# Patient Record
Sex: FEMALE | Race: WHITE | Employment: FULL TIME
[De-identification: names, ages, dates, MRNs, and addresses within clinical notes are randomized per-mention and may not be internally consistent; named-entity substitution may affect disease eponyms.]

---

## 2017-08-05 ENCOUNTER — HEALTH MAINTENANCE LETTER (OUTPATIENT)
Age: 42
End: 2017-08-05

## 2020-04-23 ENCOUNTER — TELEPHONE (OUTPATIENT)
Dept: PEDIATRICS | Facility: CLINIC | Age: 45
End: 2020-04-23

## 2020-04-23 ENCOUNTER — VIRTUAL VISIT (OUTPATIENT)
Dept: PEDIATRICS | Facility: CLINIC | Age: 45
End: 2020-04-23
Payer: COMMERCIAL

## 2020-04-23 DIAGNOSIS — F43.9 SITUATIONAL STRESS: ICD-10-CM

## 2020-04-23 DIAGNOSIS — F41.1 GAD (GENERALIZED ANXIETY DISORDER): Primary | ICD-10-CM

## 2020-04-23 PROBLEM — N92.0 MENORRHAGIA: Status: ACTIVE | Noted: 2020-04-23

## 2020-04-23 PROCEDURE — 99203 OFFICE O/P NEW LOW 30 MIN: CPT | Mod: 95 | Performed by: NURSE PRACTITIONER

## 2020-04-23 RX ORDER — LEVONORGESTREL/ETHIN.ESTRADIOL 0.1-0.02MG
TABLET ORAL
COMMUNITY
Start: 2019-05-14 | End: 2022-02-11

## 2020-04-23 RX ORDER — SERTRALINE HYDROCHLORIDE 25 MG/1
TABLET, FILM COATED ORAL
Qty: 67 TABLET | Refills: 0 | Status: SHIPPED | OUTPATIENT
Start: 2020-04-23 | End: 2020-05-22

## 2020-04-23 ASSESSMENT — ANXIETY QUESTIONNAIRES
3. WORRYING TOO MUCH ABOUT DIFFERENT THINGS: NEARLY EVERY DAY
7. FEELING AFRAID AS IF SOMETHING AWFUL MIGHT HAPPEN: NEARLY EVERY DAY
GAD7 TOTAL SCORE: 16
2. NOT BEING ABLE TO STOP OR CONTROL WORRYING: NEARLY EVERY DAY
IF YOU CHECKED OFF ANY PROBLEMS ON THIS QUESTIONNAIRE, HOW DIFFICULT HAVE THESE PROBLEMS MADE IT FOR YOU TO DO YOUR WORK, TAKE CARE OF THINGS AT HOME, OR GET ALONG WITH OTHER PEOPLE: EXTREMELY DIFFICULT
6. BECOMING EASILY ANNOYED OR IRRITABLE: SEVERAL DAYS
1. FEELING NERVOUS, ANXIOUS, OR ON EDGE: NEARLY EVERY DAY
5. BEING SO RESTLESS THAT IT IS HARD TO SIT STILL: SEVERAL DAYS

## 2020-04-23 ASSESSMENT — PATIENT HEALTH QUESTIONNAIRE - PHQ9
SUM OF ALL RESPONSES TO PHQ QUESTIONS 1-9: 5
5. POOR APPETITE OR OVEREATING: MORE THAN HALF THE DAYS

## 2020-04-23 NOTE — TELEPHONE ENCOUNTER
Please call pt and help her schedule phone visit for anxiety follow-up/med check in 4 wks with Blanca Castro or Krystin Menchaca  Thanks  Olya Whyte, APRN, CNP

## 2020-04-23 NOTE — TELEPHONE ENCOUNTER
The pt is aware and scheduled for her upcoming appointment.   Felicia Worthington on 4/23/2020 at 11:12 AM

## 2020-04-23 NOTE — PROGRESS NOTES
"Melina Terrell is a 45 year old female who is being evaluated via a billable telephone visit.      The patient has been notified of following:     \"This telephone visit will be conducted via a call between you and your physician/provider. We have found that certain health care needs can be provided without the need for a physical exam.  This service lets us provide the care you need with a short phone conversation.  If a prescription is necessary we can send it directly to your pharmacy.  If lab work is needed we can place an order for that and you can then stop by our lab to have the test done at a later time.    Telephone visits are billed at different rates depending on your insurance coverage. During this emergency period, for some insurers they may be billed the same as an in-person visit.  Please reach out to your insurance provider with any questions.    If during the course of the call the physician/provider feels a telephone visit is not appropriate, you will not be charged for this service.\"    Patient has given verbal consent for Telephone visit?  Yes    How would you like to obtain your AVS? Mail a copy    Subjective   HPI  Abnormal Mood Symptoms      Duration: 1 month    Description:  Depression: YES  Anxiety: YES  Panic attacks: YES     Accompanying signs and symptoms: see PHQ-9 and JOCELYNE scores    History (similar episodes/previous evaluation): None    Precipitating or alleviating factors: deep breaths    Therapies tried and outcome: none    Pt new to the clinic. In otherwise good health. Hx of social anxiety for \"years\" but has always been able to cope with this.    Notes over the past 1 month or so she has felt increased anxiety. 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 She worries about getting sick and/or getting her family sick.   Notes she also has hx of IBS exacerbated by stress/anxiety and notes increased diarrhea and panic symptoms. She is able to fall asleep and " sleep through the night but has used OTC sleep aids on occasion. The patient denies any thought of suicide, self harm, or harming others. She has been going on walks daily and watching TV to relax. No previous meds for anxiety or depression. Admits anxiety>depression. She feels she doesn't want to go to work and missed a day of work due to anxiety, which is very unlike her.       Reviewed and updated as needed this visit by Provider  Meds  Problems         Review of Systems   Otherwise ROS is negative except as stated above.       Objective   Reported vitals:  There were no vitals taken for this visit.   PSYCH: Alert and oriented times 3; coherent speech, normal   rate and volume, able to articulate logical thoughts, able   to abstract reason, no tangential thoughts, no hallucinations   or delusions  Her affect is normal and pleasant  RESP: No cough, no audible wheezing, able to talk in full sentences  Remainder of exam unable to be completed due to telephone visits    Diagnostic Test Results:  none       Assessment/Plan:  1. JOCELYNE (generalized anxiety disorder)  2. Situational stress  Discussed proper use of medication and possible s/e. Also encouraged she continue with self care. She declines therapist but would consider in the future if anxiety worsens. Follow-up in 4 weeks for med check, to let me know sooner if any problems with the medication.  - sertraline (ZOLOFT) 25 MG tablet; Take 1 tablet (25 mg) by mouth daily for 7 days, THEN 2 tablets (50 mg) daily.  Dispense: 67 tablet; Refill: 0    Return in about 4 weeks (around 5/21/2020) for Routine Visit.    Phone call duration:  26  Minutes (review of PMH, counseling on anxiety, and medications)    SALIMA Cruz, CNP

## 2020-04-24 ASSESSMENT — ANXIETY QUESTIONNAIRES: GAD7 TOTAL SCORE: 16

## 2020-05-21 NOTE — PROGRESS NOTES
"Melina Terrell is a 45 year old female who is being evaluated via a billable video visit.      The patient has been notified of following:     \"This video visit will be conducted via a call between you and your physician/provider. We have found that certain health care needs can be provided without the need for an in-person physical exam.  This service lets us provide the care you need with a video conversation.  If a prescription is necessary we can send it directly to your pharmacy.  If lab work is needed we can place an order for that and you can then stop by our lab to have the test done at a later time.    Video visits are billed at different rates depending on your insurance coverage.  Please reach out to your insurance provider with any questions.    If during the course of the call the physician/provider feels a video visit is not appropriate, you will not be charged for this service.\"    Patient has given verbal consent for Video visit? {YES-NO  Default Yes:4444::\"Yes\"}    How would you like to obtain your AVS? {AVS Preference:106077}    Patient would like the video invitation sent by: {video visit invitation:947710}    Will anyone else be joining your video visit? {:378085}  {If patient encounters technical issues they should call [phone] :799711}    Subjective     Melina Terrell is a 45 year old female who presents today via video visit for the following health issues:    HPI  {SUPERLIST (Optional):415203}  {PEDS Chronic and Acute Problems (Optional):472457}     Video Start Time: {video visit start/end time for provider to select:786386}    {additonal problems for provider to add (Optional):006622}    {HIST REVIEW/ LINKS 2 (Optional):855173}    Reviewed and updated as needed this visit by Provider         Review of Systems   {ROS COMP (Optional):035425}      Objective    There were no vitals taken for this visit.  Estimated body mass index is 26.85 kg/m  as calculated from the " "following:    Height as of 12/20/13: 1.613 m (5' 3.5\").    Weight as of 12/20/13: 69.9 kg (154 lb).  Physical Exam     {video visit exam brief selected:051346::\"GENERAL: Healthy, alert and no distress\",\"EYES: Eyes grossly normal to inspection.  No discharge or erythema, or obvious scleral/conjunctival abnormalities.\",\"RESP: No audible wheeze, cough, or visible cyanosis.  No visible retractions or increased work of breathing.  \",\"SKIN: Visible skin clear. No significant rash, abnormal pigmentation or lesions.\",\"NEURO: Cranial nerves grossly intact.  Mentation and speech appropriate for age.\",\"PSYCH: Mentation appears normal, affect normal/bright, judgement and insight intact, normal speech and appearance well-groomed.\"}      {Diagnostic Test Results (Optional):293269::\"Diagnostic Test Results:\",\"Labs reviewed in Epic\"}        {PROVIDER CHARTING PREFERENCE:547446}      Video-Visit Details    Type of service:  Video Visit    Video End Time:{video visit start/end time for provider to select:431470}    Originating Location (pt. Location): {video visit patient location:364471::\"Home\"}    Distant Location (provider location):  [facility]     Platform used for Video Visit: {Virtual Visit Platforms:511343::\"[employer]\"}    No follow-ups on file.       {signature options:439494}      "

## 2020-05-22 ENCOUNTER — VIRTUAL VISIT (OUTPATIENT)
Dept: PEDIATRICS | Facility: CLINIC | Age: 45
End: 2020-05-22
Payer: COMMERCIAL

## 2020-05-22 DIAGNOSIS — F41.9 ANXIETY: Primary | ICD-10-CM

## 2020-05-22 DIAGNOSIS — K58.0 IRRITABLE BOWEL SYNDROME WITH DIARRHEA: ICD-10-CM

## 2020-05-22 PROCEDURE — 99213 OFFICE O/P EST LOW 20 MIN: CPT | Mod: 95 | Performed by: NURSE PRACTITIONER

## 2020-05-22 ASSESSMENT — ANXIETY QUESTIONNAIRES
7. FEELING AFRAID AS IF SOMETHING AWFUL MIGHT HAPPEN: SEVERAL DAYS
1. FEELING NERVOUS, ANXIOUS, OR ON EDGE: SEVERAL DAYS
3. WORRYING TOO MUCH ABOUT DIFFERENT THINGS: NOT AT ALL
GAD7 TOTAL SCORE: 4
2. NOT BEING ABLE TO STOP OR CONTROL WORRYING: SEVERAL DAYS
5. BEING SO RESTLESS THAT IT IS HARD TO SIT STILL: NOT AT ALL
6. BECOMING EASILY ANNOYED OR IRRITABLE: NOT AT ALL
IF YOU CHECKED OFF ANY PROBLEMS ON THIS QUESTIONNAIRE, HOW DIFFICULT HAVE THESE PROBLEMS MADE IT FOR YOU TO DO YOUR WORK, TAKE CARE OF THINGS AT HOME, OR GET ALONG WITH OTHER PEOPLE: NOT DIFFICULT AT ALL

## 2020-05-22 ASSESSMENT — PATIENT HEALTH QUESTIONNAIRE - PHQ9
5. POOR APPETITE OR OVEREATING: SEVERAL DAYS
SUM OF ALL RESPONSES TO PHQ QUESTIONS 1-9: 3

## 2020-05-22 NOTE — PROGRESS NOTES
"Melina Terrell is a 45 year old female who is being evaluated via a billable telephone visit.      The patient has been notified of following:     \"This telephone visit will be conducted via a call between you and your physician/provider. We have found that certain health care needs can be provided without the need for a physical exam.  This service lets us provide the care you need with a short phone conversation.  If a prescription is necessary we can send it directly to your pharmacy.  If lab work is needed we can place an order for that and you can then stop by our lab to have the test done at a later time.    Telephone visits are billed at different rates depending on your insurance coverage. During this emergency period, for some insurers they may be billed the same as an in-person visit.  Please reach out to your insurance provider with any questions.    If during the course of the call the physician/provider feels a telephone visit is not appropriate, you will not be charged for this service.\"    Patient has given verbal consent for Telephone visit?  Yes    What phone number would you like to be contacted at? [phone]    How would you like to obtain your AVS? Mail a copy    Subjective     Melina Terrell is a 45 year old female who presents via phone visit today for the following health issues:    HPI  Medication Followup of  zoloft     Taking Medication as prescribed: yes    Side Effects:  Dry mouth    Medication Helping Symptoms:  Yes - this week has been rough      She was started on sertraline 4/23/20  History of anxiety but had not been treated with a medication prior to this  With starting of medication, felt calmer  Could feel anxiety coming on but wouldn't be as bad  Has dry mouth from it  Not too bothersome  Also history of IBS-D   Worsens with anxiety  Not sure if it worsened since medication  This has been a stressful week in general  She works  at [employer] " office  Only one doing   Busy at work      Anxiety Follow-Up    How are you doing with your anxiety since your last visit? Improved     Are you having other symptoms that might be associated with anxiety? No    Have you had a significant life event? OTHER: grandfather     Are you feeling depressed? No    Do you have any concerns with your use of alcohol or other drugs? No    Social History     Tobacco Use     Smoking status: Never Smoker     Smokeless tobacco: Never Used   Substance Use Topics     Alcohol use: Yes     Drug use: Never     JOCELYNE-7 SCORE 4/23/2020   Total Score 16     PHQ 4/23/2020   PHQ-9 Total Score 5   Q9: Thoughts of better off dead/self-harm past 2 weeks Not at all       Patient Active Problem List   Diagnosis     Menorrhagia     Irritable bowel syndrome with diarrhea     History reviewed. No pertinent surgical history.    Social History     Tobacco Use     Smoking status: Never Smoker     Smokeless tobacco: Never Used   Substance Use Topics     Alcohol use: Yes     Family History   Problem Relation Age of Onset     Diabetes Other         grandparent     Hearing Loss Other         grandparent     Hypertension Other         grandparent     Cerebrovascular Disease Other         grandpa     Depression Other         daughter, mom, son           Reviewed and updated as needed this visit by Provider         Review of Systems   Constitutional, HEENT, cardiovascular, pulmonary, gi and gu systems are negative, except as otherwise noted.       Objective   Reported vitals:  There were no vitals taken for this visit.   healthy, alert and no distress  PSYCH: Alert and oriented times 3; coherent speech, normal   rate and volume, able to articulate logical thoughts, able   to abstract reason, no tangential thoughts, no hallucinations   or delusions  Her affect is normal and pleasant  RESP: No cough, no audible wheezing, able to talk in full sentences  Remainder of exam unable to be completed due to  telephone visits    Diagnostic Test Results:  Labs reviewed in Epic        Assessment/Plan:  1. Anxiety  - Dry mouth common side effect of serotonin specific reuptake inhibitor. Reviewed tricks and strategies to treat. Ultimately, if bothersome, can try switching to different medication. Increase dose of sertraline to therapeutic level. 75 mg for 2 weeks then up to 100 mg. Monitor for worsening side effects. Then consider switching medication. F/U in 6 weeks    2. Irritable bowel syndrome with diarrhea  - Currently menstruating. Usually worse around her period      Return in about 6 weeks (around 7/3/2020) for Follow up.      Phone call duration:  14 minutes    SALIMA Johnson CNP

## 2020-05-23 ASSESSMENT — ANXIETY QUESTIONNAIRES: GAD7 TOTAL SCORE: 4

## 2020-11-22 ENCOUNTER — HEALTH MAINTENANCE LETTER (OUTPATIENT)
Age: 45
End: 2020-11-22

## 2021-01-28 ENCOUNTER — IMMUNIZATION (OUTPATIENT)
Dept: NURSING | Facility: CLINIC | Age: 46
End: 2021-01-28
Payer: COMMERCIAL

## 2021-01-28 PROCEDURE — 91300 PR COVID VAC PFIZER DIL RECON 30 MCG/0.3 ML IM: CPT

## 2021-01-28 PROCEDURE — 0001A PR COVID VAC PFIZER DIL RECON 30 MCG/0.3 ML IM: CPT

## 2021-02-13 ENCOUNTER — HEALTH MAINTENANCE LETTER (OUTPATIENT)
Age: 46
End: 2021-02-13

## 2021-02-18 ENCOUNTER — IMMUNIZATION (OUTPATIENT)
Dept: NURSING | Facility: CLINIC | Age: 46
End: 2021-02-18
Attending: INTERNAL MEDICINE
Payer: COMMERCIAL

## 2021-02-18 PROCEDURE — 0002A PR COVID VAC PFIZER DIL RECON 30 MCG/0.3 ML IM: CPT

## 2021-02-18 PROCEDURE — 91300 PR COVID VAC PFIZER DIL RECON 30 MCG/0.3 ML IM: CPT

## 2021-02-26 DIAGNOSIS — Z12.31 VISIT FOR SCREENING MAMMOGRAM: ICD-10-CM

## 2021-02-26 PROCEDURE — 77067 SCR MAMMO BI INCL CAD: CPT | Mod: TC | Performed by: RADIOLOGY

## 2021-09-19 ENCOUNTER — HEALTH MAINTENANCE LETTER (OUTPATIENT)
Age: 46
End: 2021-09-19

## 2022-01-09 ENCOUNTER — HEALTH MAINTENANCE LETTER (OUTPATIENT)
Age: 47
End: 2022-01-09

## 2022-02-10 SDOH — ECONOMIC STABILITY: FOOD INSECURITY: WITHIN THE PAST 12 MONTHS, YOU WORRIED THAT YOUR FOOD WOULD RUN OUT BEFORE YOU GOT MONEY TO BUY MORE.: NEVER TRUE

## 2022-02-10 SDOH — HEALTH STABILITY: PHYSICAL HEALTH: ON AVERAGE, HOW MANY MINUTES DO YOU ENGAGE IN EXERCISE AT THIS LEVEL?: 0 MIN

## 2022-02-10 SDOH — HEALTH STABILITY: PHYSICAL HEALTH: ON AVERAGE, HOW MANY DAYS PER WEEK DO YOU ENGAGE IN MODERATE TO STRENUOUS EXERCISE (LIKE A BRISK WALK)?: 0 DAYS

## 2022-02-10 SDOH — ECONOMIC STABILITY: INCOME INSECURITY: HOW HARD IS IT FOR YOU TO PAY FOR THE VERY BASICS LIKE FOOD, HOUSING, MEDICAL CARE, AND HEATING?: NOT HARD AT ALL

## 2022-02-10 SDOH — ECONOMIC STABILITY: FOOD INSECURITY: WITHIN THE PAST 12 MONTHS, THE FOOD YOU BOUGHT JUST DIDN'T LAST AND YOU DIDN'T HAVE MONEY TO GET MORE.: NEVER TRUE

## 2022-02-10 SDOH — ECONOMIC STABILITY: INCOME INSECURITY: IN THE LAST 12 MONTHS, WAS THERE A TIME WHEN YOU WERE NOT ABLE TO PAY THE MORTGAGE OR RENT ON TIME?: NO

## 2022-02-10 SDOH — ECONOMIC STABILITY: TRANSPORTATION INSECURITY
IN THE PAST 12 MONTHS, HAS THE LACK OF TRANSPORTATION KEPT YOU FROM MEDICAL APPOINTMENTS OR FROM GETTING MEDICATIONS?: NO

## 2022-02-10 SDOH — ECONOMIC STABILITY: TRANSPORTATION INSECURITY
IN THE PAST 12 MONTHS, HAS LACK OF TRANSPORTATION KEPT YOU FROM MEETINGS, WORK, OR FROM GETTING THINGS NEEDED FOR DAILY LIVING?: NO

## 2022-02-10 ASSESSMENT — LIFESTYLE VARIABLES
HOW OFTEN DO YOU HAVE A DRINK CONTAINING ALCOHOL: 2-3 TIMES A WEEK
HOW MANY STANDARD DRINKS CONTAINING ALCOHOL DO YOU HAVE ON A TYPICAL DAY: 1 OR 2
HOW OFTEN DO YOU HAVE SIX OR MORE DRINKS ON ONE OCCASION: NEVER

## 2022-02-10 ASSESSMENT — ENCOUNTER SYMPTOMS
DIZZINESS: 0
DIARRHEA: 0
HEMATOCHEZIA: 0
CHILLS: 0
FREQUENCY: 1
DYSURIA: 0
PALPITATIONS: 0
COUGH: 0
SHORTNESS OF BREATH: 0
NAUSEA: 0
CONSTIPATION: 0
HEMATURIA: 0
BREAST MASS: 0
WEAKNESS: 0
NERVOUS/ANXIOUS: 0
HEARTBURN: 0
MYALGIAS: 0
EYE PAIN: 0
HEADACHES: 0
ABDOMINAL PAIN: 0
ARTHRALGIAS: 0
SORE THROAT: 0
FEVER: 0
PARESTHESIAS: 1
JOINT SWELLING: 0

## 2022-02-10 ASSESSMENT — SOCIAL DETERMINANTS OF HEALTH (SDOH)
HOW OFTEN DO YOU ATTEND CHURCH OR RELIGIOUS SERVICES?: NEVER
IN A TYPICAL WEEK, HOW MANY TIMES DO YOU TALK ON THE PHONE WITH FAMILY, FRIENDS, OR NEIGHBORS?: ONCE A WEEK
HOW OFTEN DO YOU GET TOGETHER WITH FRIENDS OR RELATIVES?: NEVER
DO YOU BELONG TO ANY CLUBS OR ORGANIZATIONS SUCH AS CHURCH GROUPS UNIONS, FRATERNAL OR ATHLETIC GROUPS, OR SCHOOL GROUPS?: YES

## 2022-02-11 ENCOUNTER — OFFICE VISIT (OUTPATIENT)
Dept: PEDIATRICS | Facility: CLINIC | Age: 47
End: 2022-02-11
Payer: COMMERCIAL

## 2022-02-11 ENCOUNTER — TELEPHONE (OUTPATIENT)
Dept: PEDIATRICS | Facility: CLINIC | Age: 47
End: 2022-02-11

## 2022-02-11 VITALS
BODY MASS INDEX: 28.61 KG/M2 | OXYGEN SATURATION: 100 % | HEART RATE: 82 BPM | HEIGHT: 64 IN | TEMPERATURE: 97.6 F | SYSTOLIC BLOOD PRESSURE: 114 MMHG | DIASTOLIC BLOOD PRESSURE: 72 MMHG | RESPIRATION RATE: 13 BRPM | WEIGHT: 167.6 LBS

## 2022-02-11 DIAGNOSIS — Z13.220 SCREENING FOR HYPERLIPIDEMIA: ICD-10-CM

## 2022-02-11 DIAGNOSIS — Z12.11 SCREEN FOR COLON CANCER: ICD-10-CM

## 2022-02-11 DIAGNOSIS — Z11.59 ENCOUNTER FOR HCV SCREENING TEST FOR LOW RISK PATIENT: ICD-10-CM

## 2022-02-11 DIAGNOSIS — Z00.00 ROUTINE GENERAL MEDICAL EXAMINATION AT A HEALTH CARE FACILITY: Primary | ICD-10-CM

## 2022-02-11 DIAGNOSIS — Z12.4 ENCOUNTER FOR SCREENING FOR CERVICAL CANCER: ICD-10-CM

## 2022-02-11 DIAGNOSIS — Z11.4 SCREENING FOR HUMAN IMMUNODEFICIENCY VIRUS WITHOUT PRESENCE OF RISK FACTORS: ICD-10-CM

## 2022-02-11 DIAGNOSIS — Z12.31 VISIT FOR SCREENING MAMMOGRAM: ICD-10-CM

## 2022-02-11 DIAGNOSIS — E66.3 OVERWEIGHT (BMI 25.0-29.9): ICD-10-CM

## 2022-02-11 DIAGNOSIS — N93.8 DYSFUNCTIONAL UTERINE BLEEDING: ICD-10-CM

## 2022-02-11 DIAGNOSIS — D50.0 IRON DEFICIENCY ANEMIA DUE TO CHRONIC BLOOD LOSS: Primary | ICD-10-CM

## 2022-02-11 LAB
ERYTHROCYTE [DISTWIDTH] IN BLOOD BY AUTOMATED COUNT: 18.8 % (ref 10–15)
HBA1C MFR BLD: 5.5 % (ref 0–5.6)
HCT VFR BLD AUTO: 31.1 % (ref 35–47)
HGB BLD-MCNC: 9.3 G/DL (ref 11.7–15.7)
MCH RBC QN AUTO: 21.3 PG (ref 26.5–33)
MCHC RBC AUTO-ENTMCNC: 29.9 G/DL (ref 31.5–36.5)
MCV RBC AUTO: 71 FL (ref 78–100)
PLATELET # BLD AUTO: 320 10E3/UL (ref 150–450)
RBC # BLD AUTO: 4.37 10E6/UL (ref 3.8–5.2)
WBC # BLD AUTO: 5.1 10E3/UL (ref 4–11)

## 2022-02-11 PROCEDURE — 87389 HIV-1 AG W/HIV-1&-2 AB AG IA: CPT | Performed by: NURSE PRACTITIONER

## 2022-02-11 PROCEDURE — 87624 HPV HI-RISK TYP POOLED RSLT: CPT | Performed by: NURSE PRACTITIONER

## 2022-02-11 PROCEDURE — 84443 ASSAY THYROID STIM HORMONE: CPT | Performed by: NURSE PRACTITIONER

## 2022-02-11 PROCEDURE — 86803 HEPATITIS C AB TEST: CPT | Performed by: NURSE PRACTITIONER

## 2022-02-11 PROCEDURE — G0145 SCR C/V CYTO,THINLAYER,RESCR: HCPCS | Performed by: NURSE PRACTITIONER

## 2022-02-11 PROCEDURE — 99386 PREV VISIT NEW AGE 40-64: CPT | Performed by: NURSE PRACTITIONER

## 2022-02-11 PROCEDURE — 85027 COMPLETE CBC AUTOMATED: CPT | Performed by: NURSE PRACTITIONER

## 2022-02-11 PROCEDURE — 99214 OFFICE O/P EST MOD 30 MIN: CPT | Mod: 25 | Performed by: NURSE PRACTITIONER

## 2022-02-11 PROCEDURE — 80061 LIPID PANEL: CPT | Performed by: NURSE PRACTITIONER

## 2022-02-11 PROCEDURE — 36415 COLL VENOUS BLD VENIPUNCTURE: CPT | Performed by: NURSE PRACTITIONER

## 2022-02-11 PROCEDURE — 83036 HEMOGLOBIN GLYCOSYLATED A1C: CPT | Performed by: NURSE PRACTITIONER

## 2022-02-11 RX ORDER — BACILLUS COAGULANS 1B CELL
1 CAPSULE ORAL EVERY OTHER DAY
Qty: 45 TABLET | Refills: 0 | Status: SHIPPED | OUTPATIENT
Start: 2022-02-11 | End: 2022-05-16

## 2022-02-11 ASSESSMENT — ENCOUNTER SYMPTOMS
HEARTBURN: 0
FEVER: 0
HEADACHES: 0
MYALGIAS: 0
ARTHRALGIAS: 0
DYSURIA: 0
WEAKNESS: 0
SHORTNESS OF BREATH: 0
CONSTIPATION: 0
COUGH: 0
BREAST MASS: 0
NERVOUS/ANXIOUS: 0
PALPITATIONS: 0
DIARRHEA: 0
ABDOMINAL PAIN: 0
JOINT SWELLING: 0
FREQUENCY: 1
PARESTHESIAS: 1
HEMATOCHEZIA: 0
EYE PAIN: 0
SORE THROAT: 0
NAUSEA: 0
HEMATURIA: 0
CHILLS: 0
DIZZINESS: 0

## 2022-02-11 ASSESSMENT — MIFFLIN-ST. JEOR: SCORE: 1372.29

## 2022-02-11 NOTE — TELEPHONE ENCOUNTER
Called and informed patient of Blanca Gloria message below. Patient will  the iron and take as prescribed. She will schedule with ALEKSANDAR and ismael a follow up lab appointment in 2 months. Nimco Regan RN on 2/11/2022 at 4:21 PM

## 2022-02-11 NOTE — TELEPHONE ENCOUNTER
Call to discuss lab results from today. I am still waiting on majority of her labs but CBC came back abnormal so I wanted to follow-up on this sooner rather than later.    A1C is normal indicating she does not have diabetes.    CBC shows iron deficiency anemia, likely secondary to dysfunctional uterine bleeding.   Recommend starting iron with vitamin C every other day to try and increase hemoglobin levels.   I have sent a prescription to the pharmacy.   Best to take on empty stomach but if this causes GI upset, can take with food.  Pruduent for her to follow-up with OB/GYN.  Recheck CBC in 2 months, orders placed.     Thanks,   Blanca Castro, DNP, APRN, CNP

## 2022-02-11 NOTE — PROGRESS NOTES
SUBJECTIVE:   CC: Melina Terrell is an 47 year old woman who presents for preventive health visit.     Patient has been advised of split billing requirements and indicates understanding: Yes     Healthy Habits:     Getting at least 3 servings of Calcium per day:  NO    Bi-annual eye exam:  Yes    Dental care twice a year:  Yes    Sleep apnea or symptoms of sleep apnea:  None    Diet:  Other    Frequency of exercise:  1 day/week    Duration of exercise:  Less than 15 minutes    Taking medications regularly:  Not Applicable    PHQ-2 Total Score: 0    Additional concerns today:  Yes    Periods more frequent since November. Has started having hot flashes.   Stopped OCP during pandemic, had been taking to help regulate per periods and lighten flow. Unfortunately, OCP did not influence her flow but did regular her cycle. She ultimately ended up discontinuing it because she ran out of refills and flow was still heavy. Periods occur every 2-3 weeks. Last 5-7 days, super heavy the first 2-3 days.     Anemia in the past, during pregnancy.     Weight gain - Dog is 13 years old, has medical issues and can't walk, exercising less because it's winter, opposite schedules with her  so doesn't have anyone to help hold her accountable.    Today's PHQ-2 Score:   PHQ-2 ( 1999 Pfizer) 2/10/2022   Q1: Little interest or pleasure in doing things 0   Q2: Feeling down, depressed or hopeless 0   PHQ-2 Score 0   Q1: Little interest or pleasure in doing things Not at all   Q2: Feeling down, depressed or hopeless Not at all   PHQ-2 Score 0     Abuse: Current or Past (Physical, Sexual or Emotional) - No  Do you feel safe in your environment? Yes    Have you ever done Advance Care Planning? (For example, a Health Directive, POLST, or a discussion with a medical provider or your loved ones about your wishes): No, advance care planning information given to patient to review.  Patient declined advance care planning discussion at this  time.    Social History     Tobacco Use     Smoking status: Never Smoker     Smokeless tobacco: Never Used   Substance Use Topics     Alcohol use: Yes     Alcohol Use 2/10/2022   Prescreen: >3 drinks/day or >7 drinks/week? No     Reviewed orders with patient.  Reviewed health maintenance and updated orders accordingly - Yes  BP Readings from Last 3 Encounters:   02/11/22 114/72   12/20/13 112/76    Wt Readings from Last 3 Encounters:   02/11/22 76 kg (167 lb 9.6 oz)   12/20/13 69.9 kg (154 lb)                    Breast Cancer Screening:    FHS-7:   Breast CA Risk Assessment (FHS-7) 2/10/2022   Did any of your first-degree relatives have breast or ovarian cancer? No   Did any of your relatives have bilateral breast cancer? No   Did any man in your family have breast cancer? No   Did any woman in your family have breast and ovarian cancer? No   Did any woman in your family have breast cancer before age 50 y? No   Do you have 2 or more relatives with breast and/or ovarian cancer? No   Do you have 2 or more relatives with breast and/or bowel cancer? No       Mammogram Screening: Recommended annual mammography  Pertinent mammograms are reviewed under the imaging tab.    History of abnormal Pap smear: NO - age 30-65 PAP every 5 years with negative HPV co-testing recommended     Reviewed and updated as needed this visit by clinical staff  Tobacco  Allergies  Meds   Med Hx  Surg Hx  Fam Hx  Soc Hx     Reviewed and updated as needed this visit by Provider               Patient Active Problem List   Diagnosis     Menorrhagia     Irritable bowel syndrome with diarrhea     Past Medical History:   Diagnosis Date     Anxiety      Past Surgical History:   Procedure Laterality Date     NO HISTORY OF SURGERY       Family History   Problem Relation Age of Onset     Obesity Mother      Deep Vein Thrombosis (DVT) Father      Diabetes Type 2  Father      Hypertension Brother      Sleep Apnea Brother      Social History      Socioeconomic History     Marital status:      Spouse name: Not on file     Number of children: Not on file     Years of education: Not on file     Highest education level: Not on file   Occupational History     Not on file   Tobacco Use     Smoking status: Never Smoker     Smokeless tobacco: Never Used   Substance and Sexual Activity     Alcohol use: Yes     Comment: occsaional     Drug use: Never     Sexual activity: Not Currently   Other Topics Concern     Not on file   Social History Narrative    Works at the  at  clinic.     .     Two adult children in their 20's. Daughter with degree in criminal justice working for [employer] (as does her ). Son in 25, struggles with substance use, kicked out of her home a couple years ago.             Blanca Castro, DNP, APRN, CNP    02/11/22     Social Determinants of Health     Financial Resource Strain: Low Risk      Difficulty of Paying Living Expenses: Not hard at all   Food Insecurity: No Food Insecurity     Worried About Running Out of Food in the Last Year: Never true     Ran Out of Food in the Last Year: Never true   Transportation Needs: No Transportation Needs     Lack of Transportation (Medical): No     Lack of Transportation (Non-Medical): No   Physical Activity: Inactive     Days of Exercise per Week: 0 days     Minutes of Exercise per Session: 0 min   Stress: No Stress Concern Present     Feeling of Stress : Only a little   Social Connections: Moderately Isolated     Frequency of Communication with Friends and Family: Once a week     Frequency of Social Gatherings with Friends and Family: Never     Attends Nondenominational Services: Never     Active Member of Clubs or Organizations: Yes     Attends Club or Organization Meetings: Not on file     Marital Status:    Intimate Partner Violence: Not on file   Housing Stability: Low Risk      Unable to Pay for Housing in the Last Year: No     Number of Places Lived in the Last Year: 1  "    Unstable Housing in the Last Year: No     Current Outpatient Medications   Medication     Ascorbic Acid (IRA-C PO)     OMEGA-3 FATTY ACIDS PO     Multiple Vitamins-Iron (MULTIPLE VITAMIN/IRON OR)     No current facility-administered medications for this visit.        Allergies   Allergen Reactions     Codeine Sulfate      Vomiting           Review of Systems   Constitutional: Negative for chills and fever.   HENT: Negative for congestion, ear pain, hearing loss and sore throat.    Eyes: Positive for visual disturbance. Negative for pain.   Respiratory: Negative for cough and shortness of breath.    Cardiovascular: Negative for chest pain, palpitations and peripheral edema.   Gastrointestinal: Negative for abdominal pain, constipation, diarrhea, heartburn, hematochezia and nausea.   Breasts:  Negative for tenderness, breast mass and discharge.   Genitourinary: Positive for frequency, urgency and vaginal discharge. Negative for dysuria, genital sores, hematuria, pelvic pain and vaginal bleeding.   Musculoskeletal: Negative for arthralgias, joint swelling and myalgias.   Skin: Negative for rash.   Neurological: Positive for paresthesias. Negative for dizziness, weakness and headaches.   Psychiatric/Behavioral: Positive for mood changes. The patient is not nervous/anxious.        OBJECTIVE:   /72 (BP Location: Right arm, Patient Position: Sitting, Cuff Size: Adult Regular)   Pulse 82   Temp 97.6  F (36.4  C) (Tympanic)   Resp 13   Ht 1.613 m (5' 3.5\")   Wt 76 kg (167 lb 9.6 oz)   SpO2 100%   BMI 29.22 kg/m    Physical Exam  Constitutional: appears to be in no acute distress, comfortable, pleasant.   Eyes: anicteric, conjunctiva clear without drainage or erythema. SANJANA.   Ears, Nose and Throat: tympanic membranes gray with LR,  nose without nasal discharge. OP: no erythema to posterior pharynx, negative post nasal drainage, tonsils +1 no erythema or exudate.  Neck: supple, thyroid palpable,not enlarged, " no nodules   Breast: Exam deferred (deferred after discussion of exam options with patient, no symptoms or concerns).   Cardiovascular: regular rate and rhythm, normal S1 and S2, no murmurs, rubs or gallops, peripheral pulses full and symmetric; negative peripheral edema   Respiratory: Air entry throughout. Breathing pattern unlabored without the use of accessory muscles. Clear to auscultation A and P, no wheezes or crackles, normal breath sounds.    Gastrointestinal: rounded, soft. Positive bowel sounds x4, nontender, no masses.   Genitourinary: external genitalia is without lesions. Introitus is normal, vaginal walls pink and moist without lesions or evidence of trauma. There is no abnormal discharge from the cervix. Cervix is pink without polyps or lesions.  Musculoskeletal: full range of motion, no edema.   Skin: pink, turgor smooth and elastic. Negative for lesions or dryness.  Neurological: normal gait, no tremor.   Psychological: appropriate mood and affect.   Lymphatic: no cervical, axillary, supraclavicular, or infraclavicular lymphadenopathy.    Diagnostic Test Results:  Labs reviewed in Epic    ASSESSMENT/PLAN:   (Z00.00) Routine general medical examination at a health care facility  (primary encounter diagnosis)  Age appropriate screening and preventative care have been addressed today. Vaccinations have been reviewed.  Recommend annual vision exams as well as biannual dental exams. They will follow up for annual physical again in one year.   - REVIEW OF HEALTH MAINTENANCE PROTOCOL ORDERS            (Z12.31) Visit for screening mammogram  - MA SCREENING DIGITAL BILAT - Future  (s+30)            (Z12.4) Encounter for screening for cervical cancer  - PAP screen with HPV - recommended age 30 - 65 years      (Z12.11) Screen for colon cancer  - Adult Gastro Ref - Procedure Only           (Z11.4) Screening for human immunodeficiency virus without presence of risk factors  - HIV Antigen Antibody Combo         "    (Z11.59) Encounter for HCV screening test for low risk patient  - Hepatitis C Screen Reflex to HCV RNA Quant and Genotype            (Z13.220) Screening for hyperlipidemia  - Lipid panel reflex to direct LDL Fasting            (E66.3) Overweight (BMI 25.0-29.9)  Body mass index is 29.22 kg/m . Patient has been advised to follow a balanced diet with reduction in processed carbohydrates and added sugar. They have been advised to undertake routine aerobic activity and they were counseled on healthy weight maintenance.  - Hemoglobin A1c            (N93.8) Dysfunctional uterine bleeding  Periods more frequent since November. Periods are heavy. Had been taking OCP for cycle regulation and menorrhagia but stopped this during the pandemic. Rule out anemia and thyroid dysfunction. Referral to OB/GYN.  - Ob/Gyn Referral  - CBC with platelets  - TSH with free T4 reflex              Follow-up: Lab results pending, will follow-up as indicated after reviewing results.       COUNSELING:  Reviewed preventive health counseling, as reflected in patient instructions  Special attention given to:        Regular exercise       Healthy diet/nutrition       Vision screening       Immunizations       Osteoporosis prevention/bone health       Colorectal Cancer Screening       Consider Hep C screening for all patients one time for ages 18-79 years       HIV screeninx in teen years, 1x in adult years, and at intervals if high risk    Estimated body mass index is 29.22 kg/m  as calculated from the following:    Height as of this encounter: 1.613 m (5' 3.5\").    Weight as of this encounter: 76 kg (167 lb 9.6 oz).    Weight management plan: Discussed healthy diet and exercise guidelines    She reports that she has never smoked. She has never used smokeless tobacco.    Counseling Resources:  ATP IV Guidelines  Pooled Cohorts Equation Calculator  Breast Cancer Risk Calculator  BRCA-Related Cancer Risk Assessment: FHS-7 Tool  FRAX Risk " Assessment  ICSI Preventive Guidelines  Dietary Guidelines for Americans, 2010  USDA's MyPlate  ASA Prophylaxis  Lung CA Screening    SALIMA Islas CNP  [facility]

## 2022-02-11 NOTE — PATIENT INSTRUCTIONS
Preventive Health Recommendations  Female Ages 40 to 49    Yearly exam:     See your health care provider every year in order to  1. Review health changes.   2. Discuss preventive care.    3. Review your medicines if your doctor prescribed any.      Get a Pap test every three years (unless you have an abnormal result and your provider advises testing more often).      If you get Pap tests with HPV test, you only need to test every 5 years, unless you have an abnormal result. You do not need a Pap test if your uterus was removed (hysterectomy) and you have not had cancer.      You should be tested each year for STDs (sexually transmitted diseases), if you're at risk.     Ask your doctor if you should have a mammogram.      Have a colonoscopy (test for colon cancer) if someone in your family has had colon cancer or polyps before age 50.       Have a cholesterol test every 5 years.       Have a diabetes test (fasting glucose) after age 45. If you are at risk for diabetes, you should have this test every 3 years.    Shots: Get a flu shot each year. Get a tetanus shot every 10 years.     Nutrition:     Eat at least 5 servings of fruits and vegetables each day.    Eat whole-grain bread, whole-wheat pasta and brown rice instead of white grains and rice.    Get adequate Calcium and Vitamin D.      Lifestyle    Exercise at least 150 minutes a week (an average of 30 minutes a day, 5 days a week). This will help you control your weight and prevent disease.    Limit alcohol to one drink per day.    No smoking.     Wear sunscreen to prevent skin cancer.    See your dentist every six months for an exam and cleaning.    Adequate calcium is necessary for good health, and not just because it's a major component of our bones. It also plays a vital role in keeping our organs and skeletal muscles working properly.    Calcium -- A rough method of estimating dietary calcium intake is to multiply the number of dairy servings consumed  per day by 300 mg. One serving is 8 oz (240 mL) of milk or yogurt or 1 oz (29 g) of hard cheese. Cottage cheese and ice cream contain approximately 150 mg of calcium per 4 oz (113 g). Other foods in a well-balanced diet (dark green vegetables, some nuts, breads, and cereals) supply an average of 100 to 200 mg of calcium daily. Some cereals, soy products, and fruit juices are fortified with up to 1000 mg of calcium.    Recommended amount of daily calcium: 1,000-1,200 milligrams of calcium per day. Vitamin D 2,000 units daily.    Formulations:  The most widely available calcium supplements are calcium carbonate and Calcium citrate. Calcium carbonate is cheapest and therefore often a good first choice. However, there are some limitations to its use compared with calcium citrate:  ?Calcium carbonate absorption is better when taken with meals; in comparison, calcium citrate as well absorbed in the fasting state and is equally absorbed compared with calcium carbonate taken with a meal. This may be particularly important in patients with achlorhydria. Thus, it seems prudent to take calcium carbonate with meals since it is often hard to know who has achlorhydria.  ?Calcium carbonate is also poorly absorbed in patients taking proton pump inhibitors (PPIs) or H2 blockers. We usually recommend calcium citrate as a first-line calcium supplement in these patients.

## 2022-02-13 LAB
CHOLEST SERPL-MCNC: 182 MG/DL
FASTING STATUS PATIENT QL REPORTED: YES
HCV AB SERPL QL IA: NONREACTIVE
HDLC SERPL-MCNC: 74 MG/DL
HIV 1+2 AB+HIV1 P24 AG SERPL QL IA: NONREACTIVE
LDLC SERPL CALC-MCNC: 94 MG/DL
NONHDLC SERPL-MCNC: 108 MG/DL
TRIGL SERPL-MCNC: 70 MG/DL
TSH SERPL DL<=0.005 MIU/L-ACNC: 2.59 MU/L (ref 0.4–4)

## 2022-02-15 LAB
BKR LAB AP GYN ADEQUACY: NORMAL
BKR LAB AP GYN INTERPRETATION: NORMAL
BKR LAB AP HPV REFLEX: NORMAL
BKR LAB AP PREVIOUS ABNORMAL: NORMAL
PATH REPORT.COMMENTS IMP SPEC: NORMAL
PATH REPORT.COMMENTS IMP SPEC: NORMAL
PATH REPORT.RELEVANT HX SPEC: NORMAL

## 2022-02-16 LAB
HUMAN PAPILLOMA VIRUS 16 DNA: NEGATIVE
HUMAN PAPILLOMA VIRUS 18 DNA: NEGATIVE
HUMAN PAPILLOMA VIRUS FINAL DIAGNOSIS: NORMAL
HUMAN PAPILLOMA VIRUS OTHER HR: NEGATIVE

## 2022-03-04 ENCOUNTER — ANCILLARY PROCEDURE (OUTPATIENT)
Dept: MAMMOGRAPHY | Facility: CLINIC | Age: 47
End: 2022-03-04
Attending: NURSE PRACTITIONER
Payer: COMMERCIAL

## 2022-03-04 DIAGNOSIS — Z12.31 VISIT FOR SCREENING MAMMOGRAM: ICD-10-CM

## 2022-03-04 PROCEDURE — 77067 SCR MAMMO BI INCL CAD: CPT | Mod: TC | Performed by: RADIOLOGY

## 2022-05-16 ENCOUNTER — OFFICE VISIT (OUTPATIENT)
Dept: OBGYN | Facility: CLINIC | Age: 47
End: 2022-05-16
Payer: COMMERCIAL

## 2022-05-16 VITALS — DIASTOLIC BLOOD PRESSURE: 76 MMHG | WEIGHT: 168.2 LBS | SYSTOLIC BLOOD PRESSURE: 118 MMHG | BODY MASS INDEX: 29.33 KG/M2

## 2022-05-16 DIAGNOSIS — D50.0 IRON DEFICIENCY ANEMIA DUE TO CHRONIC BLOOD LOSS: ICD-10-CM

## 2022-05-16 DIAGNOSIS — N92.0 MENORRHAGIA WITH REGULAR CYCLE: Primary | ICD-10-CM

## 2022-05-16 PROCEDURE — 99204 OFFICE O/P NEW MOD 45 MIN: CPT | Performed by: OBSTETRICS & GYNECOLOGY

## 2022-05-16 RX ORDER — BACILLUS COAGULANS 1B CELL
1 CAPSULE ORAL EVERY OTHER DAY
Qty: 45 TABLET | Refills: 1 | Status: SHIPPED | OUTPATIENT
Start: 2022-05-16 | End: 2023-03-31

## 2022-05-16 NOTE — PROGRESS NOTES
SUBJECTIVE:   CC: heavy uterine bleeding                                               Melina Terrell is a 47 year old female who presents to clinic today for initial gyn evaluation of heavy uterine bleeding. Reports increased frequency of menses since November but still occurring monthly, very heavy, can be uncomfortable but not always. Stopped oral contraceptives in 2020 during pandemic. Tried continuous use but had lots of spotting and very heavy withdrawal bleeds when she did stop the pills.  Menarche age 14, always regular, always heavy. She desires definitive management.  Patient's last menstrual period was 2022.     Maternal grandmother underwent hysterectomy due to heavy uterine bleeding.    Hx  x2, 9+ and 11+lb, 20 years ago    22:  Hgb 9.3  TSH 2.59    Problem list and histories reviewed & adjusted, as indicated.  Additional history: as documented.    Patient Active Problem List   Diagnosis     Menorrhagia     Irritable bowel syndrome with diarrhea     Past Surgical History:   Procedure Laterality Date     NO HISTORY OF SURGERY        Social History     Tobacco Use     Smoking status: Never Smoker     Smokeless tobacco: Never Used   Substance Use Topics     Alcohol use: Yes     Comment: occsaional      Problem (# of Occurrences) Relation (Name,Age of Onset)    Deep Vein Thrombosis (DVT) (1) Father    Diabetes Type 2  (1) Father    Hypertension (1) Brother    Obesity (1) Mother    Sleep Apnea (1) Brother            Ascorbic Acid (IRA-C PO),   ferrous fumarate 65 mg, [place]. FE,-Vitamin C 125 mg (VITRON-C)  MG TABS tablet, Take 1 tablet by mouth every other day  Multiple Vitamins-Iron (MULTIPLE VITAMIN/IRON OR),   OMEGA-3 FATTY ACIDS PO,     No current facility-administered medications on file prior to visit.    Allergies   Allergen Reactions     Codeine Sulfate      Vomiting         OBJECTIVE:   /76   Wt 76.3 kg (168 lb 3.2 oz)   LMP 2022   BMI 29.33 kg/m      Const: sitting in chair in no acute distress, comfortable  Eyes: no scleral icterus, EOMI  CV: regular rate, well perfused  Pulm: no increased work of breathing, no cough  Skin: warm and dry, no rashes/lesions  Psych: mood stable, appropriate affect  Abd: soft, no hepatosplenomegaly, non-tender to palpation  Neuro: A+Ox3   : External genitalia normal well-estrogenized, healthy tissue.  No obvious excoriations, lesions, or rashes. Bartholins, urethra, normal.  Normal moist pink vaginal mucosa.  SSE: Normal cervix, normal physiologic discharge.   Bimanual: No CMT, small mobile uterus, minimal descensus. No adnexal masses or tenderness appreciated.     ASSESSMENT/PLAN:                                                    Melina Terrell is a 47 year old female  here for evaluation of chronic heavy uterine bleeding with mild anemia. We discussed the potential etiologies of HUB including hormonal changes (PCOS/oligoovulation, perimenopause), structural abnormalities (polyps, fibroids), adenomyosis. Patient counselled on evaluation and potential options for treatment including hormonal management, progestin IUD, endometrial ablation, hysterectomy. She has failed oral contraceptives and desires definitive management. She is not interested in endometrial ablation due to the potential for failure and necessity of another procedure to follow. She would not want to consider  Hysteroscopic polypectomy or myomectomy as an interval step in managing bleeding.     1. Menorrhagia with regular cycle  - plan pelvic US to evaluate for structural abnormalities contributing to HUB and for surgical planning.   - US Pelvic Complete with Transvaginal; Future    2. Iron deficiency anemia due to chronic blood loss  - ferrous fumarate 65 mg, [place]. FE,-Vitamin C 125 mg (VITRON-C)  MG TABS tablet; Take 1 tablet by mouth every other day  Dispense: 45 tablet; Refill: 1     Case request placed for total laparoscopic hysterectomy  bilateral salpingectomy, likely in August per patient preference. Reviewed risk, benefits, and alternatives to procedure and lydia op expectations. Will obtain over the counter analgesia and stool softeners prior to surgery.       Angeles Pearl MD  Obstetrics and Gynecology   [facility]

## 2022-05-16 NOTE — PATIENT INSTRUCTIONS
"What is a hysterectomy?  Hysterectomy is the surgical removal of the uterus. It ends menstruation and the ability to become pregnant. Depending on the reason for the surgery, a hysterectomy may also involve the removal of other organs and tissues such as the ovaries and/or fallopian tubes.     A supracervical hysterectomy is the removal of the upper part of the uterus leaving the cervix behind.   A total hysterectomy is the removal of the uterus and cervix.   A total hysterectomy with bilateral salpingo-oophorectomy is the removal of the uterus, cervix, fallopian tubes (salpingo) and ovaries (oophor). If you haven't experienced menopause, removing the ovaries will usually initiate it since your body can no longer produce as much estrogen.   Why is hysterectomy performed?  A hysterectomy may be performed to treat:     Abnormal vaginal bleeding that is not controlled by other treatment methods   Severe endometriosis (uterine tissue that grows outside the uterus)   Leiomyomas or uterine fibroids (benign tumors) that have increased in size, are painful or are causing bleeding   Increased pelvic pain related to the uterus but not controlled by other treatment   Uterine prolapse (uterus that has \"dropped\" into the vaginal canal due to weakened support muscles) that can lead to urinary incontinence or difficulty with bowel movements   Cervical or uterine cancer or abnormalities that may lead to cancer for cancer prevention   Are there alternatives to hysterectomy?  Yes. A hysterectomy is only one way to treat problems affecting the uterus. For certain conditions, however, hysterectomy may be the best choice. Please ask your health care provider to discuss what alternatives are available to treat your specific condition.  Does hysterectomy affect sexual function?  A woman's sexual function is usually not affected after hysterectomy, and her sexual desire should not change. Only if the ovaries were removed with the uterus " "prior to menopause, decreased sex drive may occur and vaginal dryness may be a problem during sex. However, estrogen therapy can relieve vaginal dryness and other hormone-related effects.    Day of procedure:  A health care provider will explain the procedure in detail, including possible complications and side effects. He or she will also answer your questions.  In addition:   Blood and urine tests are taken   An enema or bowel prep may be given to cleanse the bowel   Abdominal and pelvic areas may be shaved   An intravenous (IV) line is placed in a vein in your arm to deliver medications and fluids   During the procedure  An anesthesiologist will give you either:   General anesthesia in which you will not be awake during the procedure; or   Regional anesthesia (also called epidural or spinal anesthesia) in which medications are placed near the nerves in your lower back to \"block\" pain while you stay awake.   The surgeon removes the uterus through an incision in your abdomen or vagina or through several small incisions during laparoscopy. The method used during surgery depends on why you need the surgery and the results of your pelvic exam.  During a vaginal hysterectomy, some doctors use a laparoscope (a procedure called laparoscopically assisted vaginal hysterectomy or LAVH) to help them view the uterus and perform the surgery.   A laparoscope with advanced instruments can also be used to perform hysterectomy completely through tiny incisions (total or supracervical laparoscopic hysterectomy). In more difficult cases, surgeons may employ assistance of robotic instruments placed through the laparoscope to complete the laparoscopic hysterectomy (robotic-assisted laparoscopic hysterectomy).  How long does the procedure last?  The procedure lasts 1 to 3 hours. The amount of time you spend in the hospital for recovery varies, depending on the type of surgery performed.  The day of discharge  A responsible adult must " drive you home the day you are discharged from the hospital.  Home recovery   You may resume your normal diet, as tolerated.   You may take a bath or shower. Wash the incision with soap and water (the stitches do not have to be removed, as they will dissolve in about 6 weeks). A dressing over the incision is not necessary. Do not submerge the skin incisions until they are completely healed  You may use lotions and creams on the skin around the incision to relieve itching.   Increase your activity gradually every day, when you feel capable and aren't in pain. Completely normal activities can be resumed within 4 to 6 weeks or sooner if the procedure was performed vaginally or through the laparoscope.   Drive when you feel capable and are no longer requiring narcotic pain medications-- about 2 weeks after surgery.   You can travel out of town 3 weeks after surgery, including air travel.   Avoid lifting heavy objects (over 15 pounds) for at least 4 weeks.   Do not douche or put anything into the vagina for 4 weeks.   You may have intercourse 6 weeks after surgery, or as directed by your health care provider.   Light swimming is permitted 2 weeks after surgery in a swimming pool, but avoid vigorous swimming until 4 weeks after surgery.   Resume your exercise routine in 4 to 6 weeks, depending on how you feel.   Your doctor can tell you when it's best to go back to work. You can usually go back to work in 3 to 6 weeks, depending on the procedure.   How will I feel after hysterectomy?  Physically  After hysterectomy, your periods will stop. Occasionally, you may feel bloated and have symptoms similar to when you were menstruating. It is normal to have light vaginal bleeding or a dark brown discharge for about 4 to 6 weeks after surgery.  You may feel discomfort at the incision site for about 4 weeks, and any redness, bruising or swelling will disappear in 4 to 6 weeks. Feeling burning or itching around the incision is  normal. You may also experience a numb feeling around the incision and down your leg. This is normal and, if present, usually lasts about 2 months.  Most people experience moderate soreness in the abdomen and increased fatigue levels for 1 to 4 weeks following surgery.   If the ovaries remain, you should not experience hormone-related effects, but you may continue to have period symptoms (breast tenderness, moodiness, bloating, headache, low back pain) monthly. If the ovaries were removed with the uterus before menopause, you may experience the symptoms that often occur with menopause, such as hot flashes. If you develop these symptoms we can discuss hormone replacement therapy to relieve menopausal symptoms.  Emotionally  Emotional reactions to hysterectomy vary, depending on how well you were prepared for the surgery, the reason for having it, and whether the problem has been treated.  Some women may feel a sense of loss or become depressed, but these emotional reactions are usually temporary. Other women may find that hysterectomy improves their health and well-being, and may even be a life-saving operation. Please discuss your emotional concerns with your health care provider.  What are the complications of hysterectomy?  As with any surgery, there is a slight chance that problems may occur. Problems could include blood clots, severe infection, bleeding after surgery, bowel blockage or injury, urinary tract injury, or problems related to anesthesia.  When should I call my health care provider?  Call your health care provider if you have:   Bright red vaginal bleeding   A fever over 100.4 F   Severe nausea or vomiting   Difficulty urinating, burning feeling when urinating, or frequent urination   Increasing amount of pain   Increasing redness, swelling, or drainage from your incision   Inability to pass gas for 24 hours after surgery     Post-op discharge instructions following laparoscopic hysterectomy:    You  will likely experience some incisional and pelvic pain over the next 1-2 weeks.     You can take up to 3 tabs of ibuprofen (600mg) every 6 hours as needed for pain.  You can additionally take 1-2 tabs of tylenol (325-650mg regular strength 500-1000mg extra strength), every 6 hours for additional pain control as needed.  It is best to alternate your medications so you are taking something every 3 hours if you are having continued pain.    You have been prescribed narcotics for breakthrough pain if the above regimen is not adequate. Start by taking 1 tab and wait 30 minutes. If your pain has not improved to a 3/10 you can take a second tab. Never take more than 2 tabs at once. This medication can be taken ever 4 hours as needed.    If you have vaginal pain you can take sitz baths 1-2x/day. Fill the tub with 2-3 inches of warm water and soak the perineal and vaginal area for 10 minutes. Ice or warm packs can also be applied for comfort.       Caring for your incision:   Leave the steri-strips on for 7 days and then peel off in the shower.     Discharge instructions after receiving anesthesia or sedation:   * You have received medicine (anesthesia, sedation or both) that made you sleepy. This will affect your ability to think clearly and make good decisions.   * For your safety, you will need a responsible adult to drive you home and to stay with you for 24 hours.  * For 24 hours:  - Do not drive or use any machinery.   - Do not make important decisions.  - Do not drink alcohol. (It is also important to not drink alcohol as long as you are taking prescription pain medicine.)     Moving around after your hospital visit:   Get regular activity and try to walk for a total of 30 minutes per day. Start by walking for 5 to 10 minutes at one time and slowly build to walking for 30 minutes one time.    Slowly return to your regular level of activity. Save your energy by spreading out activities that make you tired. Rest as  needed.    Total laparoscopic or vaginal hysterectomy:   - you may resume driving in 7 days. Before you drive again, be sure you do not have severe pain and you are no longer taking prescription pain medicines   - you can return to work in 2-6weeks   - you can resume sexual activity in 8 weeks   - you may shower   - you may take a tub bath after 4 weeks or right away if your skin incisions are covered in glue   - do not lift more than 20 pounds for 6 weeks (a full gallon of milk is 9 lbs)    Side effects of a hysterectomy:   The following are normal side effects of a hysterectomy:    - Aches and pains around the incision site   - Aches in shoulders and upper chest likely caused from gas used during surgery (may last for up to 48 hours). To relieve discomfort, take prescribed pain medication or ibuprofen, lie flat or raise your hips above your shoulder level. If these don't work, call your physician.   - Slight oozing blood or watery discharge from the incision   - Bruising on abdomen   - Puffy feeling in abdomen for 1 -2 weeks   - Slight vaginal bleeding     What you may eat and drink after your hospital stay:   Resume your regular diet.     When should you be concerned?   Call your doctor if:   - you develop a temperature of 101 degrees Fahrenheit   - you have nausea and vomiting that will not stop   - you have increased pain that cannot be relieved with rest or pain medicine   - you have bright red vaginal bleeding that saturates one pad or more per hour. (It is normal to have some vaginal discharge for several weeks. It may vary in color from red to pink to brown to tan.)   - your incision becomes red, more tender, has increased drainage, or signs of infection (pain, swelling, redness, odor, warmth, green or yellow discharge)   - you have hives (itchy raised rash)   - you have any new pain or swelling in your legs   - you have problems breathing   - you have chest pain that gets worse with deep breathing or  coughing   - you have any change in movement (such as new weakness or inability to move as usual)   - you are unable to urinate or have pain or burning when you urinate   - you have any questions of concerns.     In an emergency, call 911 or have someone take you to the nearest hospital Emergency Department.     Why were you at the hospital?   Total laparoscopic hysterectomy

## 2022-05-16 NOTE — NURSING NOTE
"Chief Complaint   Patient presents with     Consult     For heavy bleeding during periods, patient reports this has been an issue for over 20 years. Periods are monthly, and can last 7-10 days, with the heavy flow lasting 3-4 days. Sometimes patient reports she can get her periods twice a month. She will need to change tampon or pad hourly, and she wears a tampon and pad. No c/o of severe cramping. She can pass a lot of blood clots at times. Has been on OCP before to help regulate periods and did not work.        Initial /76   Wt 76.3 kg (168 lb 3.2 oz)   BMI 29.33 kg/m   Estimated body mass index is 29.33 kg/m  as calculated from the following:    Height as of 22: 1.613 m (5' 3.5\").    Weight as of this encounter: 76.3 kg (168 lb 3.2 oz).  BP completed using cuff size: regular    Questioned patient about current smoking habits.  Pt. has never smoked.          The following HM Due: NONE      Andrey Morton CMA               "

## 2022-05-17 ENCOUNTER — TELEPHONE (OUTPATIENT)
Dept: OBGYN | Facility: CLINIC | Age: 47
End: 2022-05-17
Payer: COMMERCIAL

## 2022-05-17 NOTE — TELEPHONE ENCOUNTER
Surgeon:Angeles Pearl MD   Assist:  Venkat Jean   Location: [facility]   Date/time preference:  per patient, likely august     Surgery:  total laparoscopic hysterectomy bilateral salpingectomy   Length of Surgery:  90 min   Diagnosis:  heavy uterine bleeding   Anesthesia type:  GENERAL     Special instructions / equipment:  ally uterine manipulator   Am admit or same day: SAME DAY   Bowel prep: No   Pre op: PCP   Office visit with surgeon prior to surgery: No

## 2022-05-20 DIAGNOSIS — Z01.812 ENCOUNTER FOR PREOPERATIVE SCREENING LABORATORY TESTING FOR COVID-19 VIRUS: Primary | ICD-10-CM

## 2022-05-20 DIAGNOSIS — Z11.52 ENCOUNTER FOR PREOPERATIVE SCREENING LABORATORY TESTING FOR COVID-19 VIRUS: Primary | ICD-10-CM

## 2022-05-20 NOTE — TELEPHONE ENCOUNTER
Type of surgery: total laparoscopic hysterectomy, bilateral salpingectomy  Location of surgery: [facility]  Date and time of surgery: 8/4/22 @ 8:00 am  Surgeon: Dr. Pearl  Pre-Op Appt Date: Patient advised to schedule.  Post-Op Appt Date:    Packet sent out: Yes  Pre-cert/Authorization completed:  No  Date: 5/20/22

## 2022-06-10 ENCOUNTER — ANCILLARY PROCEDURE (OUTPATIENT)
Dept: ULTRASOUND IMAGING | Facility: CLINIC | Age: 47
End: 2022-06-10
Attending: OBSTETRICS & GYNECOLOGY
Payer: COMMERCIAL

## 2022-06-10 DIAGNOSIS — N92.0 MENORRHAGIA WITH REGULAR CYCLE: ICD-10-CM

## 2022-06-10 PROCEDURE — 76856 US EXAM PELVIC COMPLETE: CPT | Performed by: OBSTETRICS & GYNECOLOGY

## 2022-06-10 PROCEDURE — 76830 TRANSVAGINAL US NON-OB: CPT | Performed by: OBSTETRICS & GYNECOLOGY

## 2022-06-30 ENCOUNTER — MYC MEDICAL ADVICE (OUTPATIENT)
Dept: OBGYN | Facility: CLINIC | Age: 47
End: 2022-06-30

## 2022-07-22 ENCOUNTER — OFFICE VISIT (OUTPATIENT)
Dept: PEDIATRICS | Facility: CLINIC | Age: 47
End: 2022-07-22
Payer: COMMERCIAL

## 2022-07-22 VITALS
RESPIRATION RATE: 16 BRPM | BODY MASS INDEX: 29.82 KG/M2 | HEART RATE: 93 BPM | OXYGEN SATURATION: 98 % | TEMPERATURE: 98.8 F | WEIGHT: 171 LBS | DIASTOLIC BLOOD PRESSURE: 72 MMHG | SYSTOLIC BLOOD PRESSURE: 110 MMHG

## 2022-07-22 DIAGNOSIS — N92.0 MENORRHAGIA WITH REGULAR CYCLE: ICD-10-CM

## 2022-07-22 DIAGNOSIS — Z01.818 PREOP GENERAL PHYSICAL EXAM: Primary | ICD-10-CM

## 2022-07-22 LAB
ERYTHROCYTE [DISTWIDTH] IN BLOOD BY AUTOMATED COUNT: 14.5 % (ref 10–15)
HCT VFR BLD AUTO: 39.2 % (ref 35–47)
HGB BLD-MCNC: 12.7 G/DL (ref 11.7–15.7)
MCH RBC QN AUTO: 28.9 PG (ref 26.5–33)
MCHC RBC AUTO-ENTMCNC: 32.4 G/DL (ref 31.5–36.5)
MCV RBC AUTO: 89 FL (ref 78–100)
PLATELET # BLD AUTO: 261 10E3/UL (ref 150–450)
RBC # BLD AUTO: 4.4 10E6/UL (ref 3.8–5.2)
WBC # BLD AUTO: 4 10E3/UL (ref 4–11)

## 2022-07-22 PROCEDURE — 99213 OFFICE O/P EST LOW 20 MIN: CPT | Performed by: NURSE PRACTITIONER

## 2022-07-22 PROCEDURE — 85027 COMPLETE CBC AUTOMATED: CPT | Performed by: NURSE PRACTITIONER

## 2022-07-22 PROCEDURE — 36415 COLL VENOUS BLD VENIPUNCTURE: CPT | Performed by: NURSE PRACTITIONER

## 2022-07-22 PROCEDURE — 80053 COMPREHEN METABOLIC PANEL: CPT | Performed by: NURSE PRACTITIONER

## 2022-07-22 ASSESSMENT — PAIN SCALES - GENERAL: PAINLEVEL: NO PAIN (0)

## 2022-07-22 NOTE — PROGRESS NOTES
[facility]  [address]  [address]  [place] [address]  Phone: [phone]  Fax: [phone]  Primary Provider: Ross Fitzpatrick  Pre-op Performing Provider: JAMES TO      PREOPERATIVE EVALUATION:  Today's date: 7/22/2022    Melina Terrell is a 47 year old female who presents for a preoperative evaluation.    Surgical Information:  Surgery/Procedure: Hysterectomy   Surgery Location: [facility]   Surgeon: Dr. Pearl   Surgery Date: 8/4/22  Time of Surgery: 8:00 AM   Where patient plans to recover: At home with family  Fax number for surgical facility: Note does not need to be faxed, will be available electronically in Epic.    Type of Anesthesia Anticipated: General    Assessment & Plan     The proposed surgical procedure is considered INTERMEDIATE risk.    Preop general physical exam  Menorrhagia with regular cycle  Plan for hysterectomy with Dr. Pearl on 8/4/22 due to history of menorrhagia resulting in anemia which has resolved with iron supplementation. Metabolic panel shows elevated glucose, however this is likely due to the fact that patient was not fasting at time of her lab draw.   - CBC with platelets  - Comprehensive metabolic panel (BMP + Alb, Alk Phos, ALT, AST, Total. Bili, TP)           Risks and Recommendations:  The patient has the following additional risks and recommendations for perioperative complications:   - No identified additional risk factors other than previously addressed    Medication Instructions:  Patient is to take all scheduled medications on the day of surgery EXCEPT for modifications listed below:   - Advised to HOLD omega-3 fatty acids and multivitamin 14 days prior to procedure.    RECOMMENDATION:  APPROVAL GIVEN to proceed with proposed procedure, without further diagnostic evaluation.        20 minutes spent on the date of the encounter doing chart review, review of test results, interpretation of tests,  patient visit and documentation         Subjective     HPI related to upcoming procedure: Menorrahagia with regular cycle.    Preop Questions 7/20/2022   1. Have you ever had a heart attack or stroke? No   2. Have you ever had surgery on your heart or blood vessels, such as a stent placement, a coronary artery bypass, or surgery on an artery in your head, neck, heart, or legs? No   3. Do you have chest pain with activity? No   4. Do you have a history of  heart failure? No   5. Do you currently have a cold, bronchitis or symptoms of other infection? No   6. Do you have a cough, shortness of breath, or wheezing? No   7. Do you or anyone in your family have previous history of blood clots? YES - father had DVT. No known hx of familial clotting disorders.   8. Do you or does anyone in your family have a serious bleeding problem such as prolonged bleeding following surgeries or cuts? No   9. Have you ever had problems with anemia or been told to take iron pills? YES - hx of iron supplementation   10. Have you had any abnormal blood loss such as black, tarry or bloody stools, or abnormal vaginal bleeding? YES - menorrhagia    11. Have you ever had a blood transfusion? No   12. Are you willing to have a blood transfusion if it is medically needed before, during, or after your surgery? Yes   13. Have you or any of your relatives ever had problems with anesthesia? YES - father with sleepiness postoperatively.   14. Do you have sleep apnea, excessive snoring or daytime drowsiness? No   15. Do you have any artifical heart valves or other implanted medical devices like a pacemaker, defibrillator, or continuous glucose monitor? No   16. Do you have artificial joints? No   17. Are you allergic to latex? No   18. Is there any chance that you may be pregnant? No       Health Care Directive:  Patient does not have a Health Care Directive or Living Will: Discussed advance care planning with patient; however, patient declined at this  time.    Preoperative Review of :   reviewed - no record of controlled substances prescribed.      Patient Active Problem List   Diagnosis     Menorrhagia     Irritable bowel syndrome with diarrhea     Past Medical History:   Diagnosis Date     Anxiety      Past Surgical History:   Procedure Laterality Date     NO HISTORY OF SURGERY       Family History   Problem Relation Age of Onset     Obesity Mother      Deep Vein Thrombosis (DVT) Father      Diabetes Type 2  Father      Hypertension Brother      Sleep Apnea Brother      Social History     Socioeconomic History     Marital status:      Spouse name: Not on file     Number of children: Not on file     Years of education: Not on file     Highest education level: Not on file   Occupational History     Not on file   Tobacco Use     Smoking status: Never Smoker     Smokeless tobacco: Never Used   Vaping Use     Vaping Use: Never used   Substance and Sexual Activity     Alcohol use: Yes     Comment: occsaional     Drug use: Never     Sexual activity: Not Currently   Other Topics Concern     Not on file   Social History Narrative    Works at the  at PT clinic.     .     Two adult children in their 20's. Daughter with degree in criminal justice working for [employer] (as does her ). Son in 25, struggles with substance use, kicked out of her home a couple years ago.             Blanca Castro, DNP, APRN, CNP    02/11/22     Social Determinants of Health     Financial Resource Strain: Low Risk      Difficulty of Paying Living Expenses: Not hard at all   Food Insecurity: No Food Insecurity     Worried About Running Out of Food in the Last Year: Never true     Ran Out of Food in the Last Year: Never true   Transportation Needs: No Transportation Needs     Lack of Transportation (Medical): No     Lack of Transportation (Non-Medical): No   Physical Activity: Inactive     Days of Exercise per Week: 0 days     Minutes of Exercise per Session: 0 min    Stress: No Stress Concern Present     Feeling of Stress : Only a little   Social Connections: Moderately Isolated     Frequency of Communication with Friends and Family: Once a week     Frequency of Social Gatherings with Friends and Family: Never     Attends Baptism Services: Never     Active Member of Clubs or Organizations: Yes     Attends Club or Organization Meetings: Not on file     Marital Status:    Intimate Partner Violence: Not on file   Housing Stability: Low Risk      Unable to Pay for Housing in the Last Year: No     Number of Places Lived in the Last Year: 1     Unstable Housing in the Last Year: No     Current Outpatient Medications   Medication     ferrous fumarate 65 mg, [place]. FE,-Vitamin C 125 mg (VITRON-C)  MG TABS tablet     Multiple Vitamins-Iron (MULTIPLE VITAMIN/IRON OR)     OMEGA-3 FATTY ACIDS PO     No current facility-administered medications for this visit.        Allergies   Allergen Reactions     Codeine Sulfate      Vomiting           Review of Systems  CONSTITUTIONAL: NEGATIVE for fever, chills, change in weight  INTEGUMENTARY/SKIN: NEGATIVE for worrisome rashes, moles or lesions  EYES: NEGATIVE for vision changes or irritation  ENT/MOUTH: NEGATIVE for ear, mouth and throat problems  RESP: NEGATIVE for significant cough or SOB  CV: NEGATIVE for chest pain, palpitations or peripheral edema  GI: NEGATIVE for nausea, abdominal pain, heartburn, or change in bowel habits  : NEGATIVE for frequency, dysuria, or hematuria  MUSCULOSKELETAL: NEGATIVE for significant arthralgias or myalgia  NEURO: NEGATIVE for weakness, dizziness or paresthesias  ENDOCRINE: NEGATIVE for temperature intolerance, skin/hair changes  HEME: NEGATIVE for bleeding problems  PSYCHIATRIC: NEGATIVE for changes in mood or affect        Objective     /72   Pulse 93   Temp 98.8  F (37.1  C) (Tympanic)   Resp 16   Wt 77.6 kg (171 lb)   LMP 07/22/2022   SpO2 98%   BMI 29.82 kg/m      Physical  Exam  Constitutional: appears to be in no acute distress, comfortable, pleasant.   Eyes: anicteric, conjunctiva clear without drainage or erythema. SANJANA.   Ears, Nose and Throat: tympanic membranes gray with LR,  nose without nasal discharge. OP: no erythema to posterior pharynx, negative post nasal drainage, tonsils +1 no erythema or exudate.  Neck: supple, thyroid palpable,not enlarged, no nodules   Cardiovascular: regular rate and rhythm, normal S1 and S2, no murmurs, rubs or gallops, peripheral pulses full and symmetric; negative peripheral edema   Respiratory: Air entry throughout. Breathing pattern unlabored without the use of accessory muscles. Clear to auscultation A and P, no wheezes or crackles, normal breath sounds.    Gastrointestinal: rounded, soft. Positive bowel sounds x4, nontender, no masses.   Musculoskeletal: full range of motion, no edema.   Skin: pink, turgor smooth and elastic. Negative for lesions or dryness.  Neurological: normal gait, no tremor.   Psychological: appropriate mood and affect.   Lymphatic: no cervical, axillary, supraclavicular, or infraclavicular lymphadenopathy.    Diagnostics:  Recent Results (from the past 168 hour(s))   CBC with platelets    Collection Time: 07/22/22  2:04 PM   Result Value Ref Range    WBC Count 4.0 4.0 - 11.0 10e3/uL    RBC Count 4.40 3.80 - 5.20 10e6/uL    Hemoglobin 12.7 11.7 - 15.7 g/dL    Hematocrit 39.2 35.0 - 47.0 %    MCV 89 78 - 100 fL    MCH 28.9 26.5 - 33.0 pg    MCHC 32.4 31.5 - 36.5 g/dL    RDW 14.5 10.0 - 15.0 %    Platelet Count 261 150 - 450 10e3/uL   Comprehensive metabolic panel (BMP + Alb, Alk Phos, ALT, AST, Total. Bili, TP)    Collection Time: 07/22/22  2:04 PM   Result Value Ref Range    Sodium 140 133 - 144 mmol/L    Potassium 3.7 3.4 - 5.3 mmol/L    Chloride 108 94 - 109 mmol/L    Carbon Dioxide (CO2) 26 20 - 32 mmol/L    Anion Gap 6 3 - 14 mmol/L    Urea Nitrogen 12 7 - 30 mg/dL    Creatinine 0.74 0.52 - 1.04 mg/dL    Calcium 9.1  8.5 - 10.1 mg/dL    Glucose 120 (H) 70 - 99 mg/dL    Alkaline Phosphatase 61 40 - 150 U/L    AST 12 0 - 45 U/L    ALT 17 0 - 50 U/L    Protein Total 7.2 6.8 - 8.8 g/dL    Albumin 3.9 3.4 - 5.0 g/dL    Bilirubin Total 0.2 0.2 - 1.3 mg/dL    GFR Estimate >90 >60 mL/min/1.73m2      No EKG required, no history of coronary heart disease, significant arrhythmia, peripheral arterial disease or other structural heart disease.    Revised Cardiac Risk Index (RCRI):  The patient has the following serious cardiovascular risks for perioperative complications:   - No serious cardiac risks = 0 points     RCRI Interpretation: 0 points: Class I (very low risk - 0.4% complication rate)           Signed Electronically by: SALIMA Islas CNP  Copy of this evaluation report is provided to requesting physician.

## 2022-07-22 NOTE — PATIENT INSTRUCTIONS
Preparing for Your Surgery  Getting started  A nurse will call you to review your health history and instructions. They will give you an arrival time based on your scheduled surgery time. Please be ready to share:    Your doctor's clinic name and phone number    Your medical, surgical and anesthesia history    A list of allergies and sensitivities    A list of medicines, including herbal treatments and over-the-counter drugs    Whether the patient has a legal guardian (ask how to send us the papers in advance)  Please tell us if you're pregnant--or if there's any chance you might be pregnant. Some surgeries may injure a fetus (unborn baby), so they require a pregnancy test. Surgeries that are safe for a fetus don't always need a test, and you can choose whether to have one.   If you have a child who's having surgery, please ask for a copy of Preparing for Your Child's Surgery.    Preparing for surgery    Within 30 days of surgery: Have a pre-op exam (sometimes called an H&P, or History and Physical). This can be done at a clinic or pre-operative center.  ? If you're having a , you may not need this exam. Talk to your care team.    At your pre-op exam, talk to your care team about all medicines you take. If you need to stop any medicines before surgery, ask when to start taking them again.  ? We do this for your safety. Many medicines can make you bleed too much during surgery. Some change how well surgery (anesthesia) drugs work.    Call your insurance company to let them know you're having surgery. (If you don't have insurance, call [phone].)    Call your clinic if there's any change in your health. This includes signs of a cold or flu (sore throat, runny nose, cough, rash, fever). It also includes a scrape or scratch near the surgery site.    If you have questions on the day of surgery, call your hospital or surgery center.  COVID testing  You may need to be tested for COVID-19 before having  surgery. If so, we will give you instructions.  Eating and drinking guidelines  For your safety: Unless your surgeon tells you otherwise, follow the guidelines below.    Eat and drink as usual until 8 hours before surgery. After that, no food or milk.    Drink clear liquids until 2 hours before surgery. These are liquids you can see through, like water, Gatorade and Propel Water. You may also have black coffee and tea (no cream or milk).    Nothing by mouth within 2 hours of surgery. This includes gum, candy and breath mints.    If you drink alcohol: Stop drinking it the night before surgery.    If your care team tells you to take medicine on the morning of surgery, it's okay to take it with a sip of water.  Preventing infection    Shower or bathe the night before and morning of your surgery. Follow the instructions your clinic gave you. (If no instructions, use regular soap.)    Don't shave or clip hair near your surgery site. We'll remove the hair if needed.    Don't smoke or vape the morning of surgery. You may chew nicotine gum up to 2 hours before surgery. A nicotine patch is okay.  ? Note: Some surgeries require you to completely quit smoking and nicotine. Check with your surgeon.    Your care team will make every effort to keep you safe from infection. We will:  ? Clean our hands often with soap and water (or an alcohol-based hand rub).  ? Clean the skin at your surgery site with a special soap that kills germs.  ? Give you a special gown to keep you warm. (Cold raises the risk of infection.)  ? Wear special hair covers, masks, gowns and gloves during surgery.  ? Give antibiotic medicine, if prescribed. Not all surgeries need antibiotics.  What to bring on the day of surgery    Photo ID and insurance card    Copy of your health care directive, if you have one    Glasses and hearing aides (bring cases)  ? You can't wear contacts during surgery    Inhaler and eye drops, if you use them (tell us about these when  you arrive)    CPAP machine or breathing device, if you use them    A few personal items, if spending the night    If you have . . .  ? A pacemaker, ICD (cardiac defibrillator) or other implant: Bring the ID card.  ? An implanted stimulator: Bring the remote control.  ? A legal guardian: Bring a copy of the certified (court-stamped) guardianship papers.  Please remove any jewelry, including body piercings. Leave jewelry and other valuables at home.  If you're going home the day of surgery    You must have a responsible adult drive you home. They should stay with you overnight as well.    If you don't have someone to stay with you, and you aren't safe to go home alone, we may keep you overnight. Insurance often won't pay for this.  After surgery  If it's hard to control your pain or you need more pain medicine, please call your surgeon's office.  Questions?   If you have any questions for your care team, list them here: _________________________________________________________________________________________________________________________________________________________________________ ____________________________________ ____________________________________ ____________________________________  For informational purposes only. Not to replace the advice of your health care provider. Copyright   2003, 2019 [facility]. All rights reserved. Clinically reviewed by Rayne Macdonald MD. [place] 133113 - REV 07/21.

## 2022-07-23 LAB
ALBUMIN SERPL-MCNC: 3.9 G/DL (ref 3.4–5)
ALP SERPL-CCNC: 61 U/L (ref 40–150)
ALT SERPL W P-5'-P-CCNC: 17 U/L (ref 0–50)
ANION GAP SERPL CALCULATED.3IONS-SCNC: 6 MMOL/L (ref 3–14)
AST SERPL W P-5'-P-CCNC: 12 U/L (ref 0–45)
BILIRUB SERPL-MCNC: 0.2 MG/DL (ref 0.2–1.3)
BUN SERPL-MCNC: 12 MG/DL (ref 7–30)
CALCIUM SERPL-MCNC: 9.1 MG/DL (ref 8.5–10.1)
CHLORIDE BLD-SCNC: 108 MMOL/L (ref 94–109)
CO2 SERPL-SCNC: 26 MMOL/L (ref 20–32)
CREAT SERPL-MCNC: 0.74 MG/DL (ref 0.52–1.04)
GFR SERPL CREATININE-BSD FRML MDRD: >90 ML/MIN/1.73M2
GLUCOSE BLD-MCNC: 120 MG/DL (ref 70–99)
POTASSIUM BLD-SCNC: 3.7 MMOL/L (ref 3.4–5.3)
PROT SERPL-MCNC: 7.2 G/DL (ref 6.8–8.8)
SODIUM SERPL-SCNC: 140 MMOL/L (ref 133–144)

## 2022-07-29 ENCOUNTER — LAB (OUTPATIENT)
Dept: LAB | Facility: CLINIC | Age: 47
End: 2022-07-29
Payer: COMMERCIAL

## 2022-07-29 DIAGNOSIS — Z01.812 ENCOUNTER FOR PREOPERATIVE SCREENING LABORATORY TESTING FOR COVID-19 VIRUS: ICD-10-CM

## 2022-07-29 DIAGNOSIS — Z11.52 ENCOUNTER FOR PREOPERATIVE SCREENING LABORATORY TESTING FOR COVID-19 VIRUS: ICD-10-CM

## 2022-07-29 PROCEDURE — U0003 INFECTIOUS AGENT DETECTION BY NUCLEIC ACID (DNA OR RNA); SEVERE ACUTE RESPIRATORY SYNDROME CORONAVIRUS 2 (SARS-COV-2) (CORONAVIRUS DISEASE [COVID-19]), AMPLIFIED PROBE TECHNIQUE, MAKING USE OF HIGH THROUGHPUT TECHNOLOGIES AS DESCRIBED BY CMS-2020-01-R: HCPCS

## 2022-07-29 PROCEDURE — U0005 INFEC AGEN DETEC AMPLI PROBE: HCPCS

## 2022-07-30 LAB — SARS-COV-2 RNA RESP QL NAA+PROBE: NEGATIVE

## 2022-08-04 ENCOUNTER — LAB REQUISITION (OUTPATIENT)
Dept: LAB | Facility: CLINIC | Age: 47
End: 2022-08-04
Payer: COMMERCIAL

## 2022-08-04 ENCOUNTER — HOSPITAL (OUTPATIENT)
Dept: OBGYN | Facility: CLINIC | Age: 47
End: 2022-08-04
Payer: COMMERCIAL

## 2022-08-04 DIAGNOSIS — G89.18 ACUTE POST-OPERATIVE PAIN: Primary | ICD-10-CM

## 2022-08-04 PROCEDURE — 88307 TISSUE EXAM BY PATHOLOGIST: CPT | Mod: TC,ORL | Performed by: PEDIATRICS

## 2022-08-04 PROCEDURE — 58571 TLH W/T/O 250 G OR LESS: CPT | Performed by: OBSTETRICS & GYNECOLOGY

## 2022-08-04 PROCEDURE — 58571 TLH W/T/O 250 G OR LESS: CPT | Mod: 80 | Performed by: OBSTETRICS & GYNECOLOGY

## 2022-08-04 PROCEDURE — 88307 TISSUE EXAM BY PATHOLOGIST: CPT | Mod: 26 | Performed by: PATHOLOGY

## 2022-08-04 RX ORDER — HYDROCODONE BITARTRATE AND ACETAMINOPHEN 5; 325 MG/1; MG/1
1 TABLET ORAL EVERY 4 HOURS PRN
Qty: 12 TABLET | Refills: 0 | Status: SHIPPED | OUTPATIENT
Start: 2022-08-04 | End: 2022-08-07

## 2022-08-04 RX ORDER — ONDANSETRON 4 MG/1
4 TABLET, ORALLY DISINTEGRATING ORAL EVERY 8 HOURS PRN
Qty: 10 TABLET | Refills: 1 | Status: SHIPPED | OUTPATIENT
Start: 2022-08-04 | End: 2023-03-31

## 2022-08-04 NOTE — OP NOTE
[facility]  Operative Note    Patient: Melina Terrell  : 1975  MRN: [id number]    Date of Service: 2022    Preoperative diagnosis:   - heavy uterine bleeding    Postoperative diagnosis:   - same as above    Procedure:   - Total Laparoscopic Hysterectomy   - bilateral salpingectomy    Surgeon: Angeles Pearl MD   Assistants: Danita Robledo MD    Anesthesia: GETA  Complications:  none  EBL: 20 cc  Urine: 400 cc of clear urine  IVF: 800 cc    Findings: Normal sized uterus in mid position on EUA, no intraabdominal pathology noted on scan of abdomen. Normal uterus, fallopian tubes, and right ovary. Left ovary with 3cm simple cyst.      Indications: Melina Terrell is a 47 year old female who presented with heavy uterine bleeding resistant to medical intervention, and has elected to proceed with total hysterectomy. I recommended risk reducing bilateral salpingectomy at the time of surgery. The risks, benefits and alternatives of surgery were discussed in detail with the patient and she agreed to proceed.  Informed consent was signed prior to the procedure.      Procedure:  The patient was taken to the operating room where she was prepped and draped in the usual sterile fashion. GETA was induced and found to be adequate. She was positioned to the dorsal lithotomy position with yellow-fin stirrups. Patient safety time out was performed. A sterile open sided speculum was placed in the patient's vagina and the cervix visualized.  A single toothed tenaculum was placed on the anterior lip of the cervix and used for traction.  The marty surgical uterine manipulator was placed.      A 5 mm umbilical skin incision was made. Veres needle was placed with ease and saline drop test was consistent with intraabdominal placement. Gas was hooked up with opening pressure of >5mm mm of mercury. The abdomen was filled to a pressure of 15 mm Hg.  A 10 mm skin incision was then made in the RLQ. Through this a  second trochar sleeve was placed into the abdominal cavity using direct observation with the laparoscope.  A 5mm incision was then made in the LLQ and a trocar was placed under direct visualization.  A survey of the abdomen and pelvis was completed with the above noted findings.  The manipulator was used to elevate the uterus and bilateral ureters were noted to be vermiculating. The right fallopian tube was elevated and excised using the thunderbeat. This was removed through the port and handed off for pathology. The right round ligament was then isolated and ligated with bipolar cautery. The right uteroovarian ligament was then transected and the ovary fell away from the specimen. It was noted to be hemostatic. The right uterine artery was skeletonized and the vesicouterine peritoneum divided sharply and bluntly to create a bladder flap which was extended across the anterior surface of the uterus at vesicouterine junction. The right uterine artery was then completely isolated and coagulated using the thumderbeat. It was then divided and initial blanching of the uterus was noted. Attention was then turned to the left side where the left fallopian tube was elevated and serially coagulated and transected. The specimen was then handed off for pathology. The left round ligament was then similarly isolated, cauterized, and divided. The left uteroovarian ligament was divided and the adnexa fell away from the operative area. The broad ligament was then divided and the bladder flap completed. The left uterine artery was then cauterized in three locations and transected in the middle with further blanching of the uterus.  After further dissection of the bladder from the cervix the colpotomy was performed with the thunderbeat and the uterus was completely .  The uterus and cervix were then delivered through the vagina, and the glove was reinserted to maintain pneumoperitoneum. The vaginal vault was closed with  running V-lock using the [email or web address] needle . The abdomen was irrigated, and excellent hemostasis was noted.     The pelvis was thoroughly irrigated and found to be hemostatic. A final look at the surgical sites showed excellent hemostasis and the 10mm port was closed using the Bear Ace.    The gas was released from the abdomen and the trochars were removed.  The skin was closed with 4-0 monocryl.     The patient went to the postanesthesia recovery room in stable condition.       Dr. Robledo actively first assisted during this operation providing necessary retraction and exposure as well as maintaining hemostasis and a clear operative field. She was required as a second set of skilled hands is necessary in a hysterectomy and no other 1st assist was available. This was helpful in allowing the operation to proceed in a safe and expeditious manner. Dr. Robledo was present for the entire duration of the operation.            Angeles Pearl MD   8/4/2022 8:03 AM

## 2022-08-05 LAB
PATH REPORT.COMMENTS IMP SPEC: NORMAL
PATH REPORT.COMMENTS IMP SPEC: NORMAL
PATH REPORT.FINAL DX SPEC: NORMAL
PATH REPORT.GROSS SPEC: NORMAL
PATH REPORT.MICROSCOPIC SPEC OTHER STN: NORMAL
PATH REPORT.RELEVANT HX SPEC: NORMAL
PHOTO IMAGE: NORMAL

## 2022-08-25 ENCOUNTER — OFFICE VISIT (OUTPATIENT)
Dept: OBGYN | Facility: CLINIC | Age: 47
End: 2022-08-25
Payer: COMMERCIAL

## 2022-08-25 VITALS — SYSTOLIC BLOOD PRESSURE: 122 MMHG | BODY MASS INDEX: 29.73 KG/M2 | WEIGHT: 170.5 LBS | DIASTOLIC BLOOD PRESSURE: 82 MMHG

## 2022-08-25 DIAGNOSIS — Z90.710 H/O TOTAL HYSTERECTOMY: Primary | ICD-10-CM

## 2022-08-25 PROCEDURE — 99024 POSTOP FOLLOW-UP VISIT: CPT | Performed by: OBSTETRICS & GYNECOLOGY

## 2022-08-25 NOTE — LETTER
[facility] [place]  [address]  [address]  [facility] [address]  [phone]          August 25, 2022    RE:  Melina Terrell                                                                                                                                                       [address] [place] [address]            To whom it may concern:    Melina Terrell is under my professional care for gynecologic surgery which occurred on 8/4/22. She  may return to work on 9/6/22.    When the patient returns to work, the following restrictions apply until 9/15/22:   Lift, carry, push, and pull no more than:  0-15 lbs.      Sincerely,        Angeles Pearl MD

## 2022-08-25 NOTE — NURSING NOTE
"Chief Complaint   Patient presents with     Post-op Visit     Surgery 2022   [facility] with BS        Initial /82 (BP Location: Left arm, Cuff Size: Adult Regular)   Wt 77.3 kg (170 lb 8 oz)   LMP 2022   Breastfeeding No   BMI 29.73 kg/m   Estimated body mass index is 29.73 kg/m  as calculated from the following:    Height as of 22: 1.613 m (5' 3.5\").    Weight as of this encounter: 77.3 kg (170 lb 8 oz).  BP completed using cuff size: regular    Questioned patient about current smoking habits.  Pt. has never smoked.          The following HM Due: NONE      Noris Garza, FARA on 2022 at 3:38 PM              "

## 2022-08-25 NOTE — PROGRESS NOTES
Gyn Clinic Postoperative Visit Note  2022    S: Melina Terrell is a 47 year old  here for post-operative visit following total laparoscopic hysterectomy bilateral salpingectomy on 22.  She reports feeling well.   no fevers/chills.  no abdominal pain.  Ongoing fatigue, but no other complaints. Getting out for walks.       O:   /82 (BP Location: Left arm, Cuff Size: Adult Regular)   Wt 77.3 kg (170 lb 8 oz)   LMP 2022   Breastfeeding No   BMI 29.73 kg/m    Exam:   Abd: soft, non-tender, well healed laparoscopic site incisions  : External genitalia normal well-estrogenized, healthy tissue.  No obvious excoriations, lesions, or rashes. Bartholins, urethra, skeins normal.  Normal pink vaginal mucosa.  SSE: cuff intact normal physiologic discharge.   Bimanual: cuff intact, sutures palpable. No adnexal masses or tenderness appreciated.      Labs:   Hemoglobin   Date Value Ref Range Status   2022 12.7 11.7 - 15.7 g/dL Final   ]    Pathology:   Uterus with unattached bilateral fallopian tubes, hysterectomy and bilateral salpingectomy:  - Cervix without diagnostic abnormalities.  - Benign secretory endometrium, negative for hyperplasia, atypia and malignancy.   - Focal intrauterine adenomyosis.  - A 0.6 cm intrauterine leiomyoma.  - Bilateral benign fimbriated fallopian tubes without diagnostic abnormalities.    A: 47 year old female POD#21 s/p total laparoscopic hysterectomy bilateral salpingectomy. Doing well, no concerns    P:   1. Ongoing pelvic rest until 8w post-op  2. No lifting >15lb for 3 more weeks.     Return to clinic for new pain, bleeding, other gyn concerns. No further pap smears indicated.     Angeles Pearl MD   OB/Gyn  2022

## 2022-11-21 ENCOUNTER — HEALTH MAINTENANCE LETTER (OUTPATIENT)
Age: 47
End: 2022-11-21

## 2023-03-28 SDOH — HEALTH STABILITY: PHYSICAL HEALTH: ON AVERAGE, HOW MANY MINUTES DO YOU ENGAGE IN EXERCISE AT THIS LEVEL?: 0 MIN

## 2023-03-28 SDOH — ECONOMIC STABILITY: FOOD INSECURITY: WITHIN THE PAST 12 MONTHS, YOU WORRIED THAT YOUR FOOD WOULD RUN OUT BEFORE YOU GOT MONEY TO BUY MORE.: NEVER TRUE

## 2023-03-28 SDOH — ECONOMIC STABILITY: INCOME INSECURITY: IN THE LAST 12 MONTHS, WAS THERE A TIME WHEN YOU WERE NOT ABLE TO PAY THE MORTGAGE OR RENT ON TIME?: NO

## 2023-03-28 SDOH — ECONOMIC STABILITY: FOOD INSECURITY: WITHIN THE PAST 12 MONTHS, THE FOOD YOU BOUGHT JUST DIDN'T LAST AND YOU DIDN'T HAVE MONEY TO GET MORE.: NEVER TRUE

## 2023-03-28 SDOH — ECONOMIC STABILITY: INCOME INSECURITY: HOW HARD IS IT FOR YOU TO PAY FOR THE VERY BASICS LIKE FOOD, HOUSING, MEDICAL CARE, AND HEATING?: NOT HARD AT ALL

## 2023-03-28 SDOH — HEALTH STABILITY: PHYSICAL HEALTH: ON AVERAGE, HOW MANY DAYS PER WEEK DO YOU ENGAGE IN MODERATE TO STRENUOUS EXERCISE (LIKE A BRISK WALK)?: 0 DAYS

## 2023-03-28 ASSESSMENT — ENCOUNTER SYMPTOMS
BREAST MASS: 0
FEVER: 0
HEMATURIA: 0
PARESTHESIAS: 0
DIZZINESS: 0
HEADACHES: 0
NERVOUS/ANXIOUS: 0
COUGH: 0
HEMATOCHEZIA: 0
ABDOMINAL PAIN: 0
CONSTIPATION: 0
CHILLS: 0
SHORTNESS OF BREATH: 0
HEARTBURN: 0
FREQUENCY: 1
MYALGIAS: 0
DYSURIA: 0
PALPITATIONS: 0
WEAKNESS: 0
SORE THROAT: 0
JOINT SWELLING: 0
NAUSEA: 0
DIARRHEA: 0
ARTHRALGIAS: 0
EYE PAIN: 0

## 2023-03-28 ASSESSMENT — SOCIAL DETERMINANTS OF HEALTH (SDOH)
HOW OFTEN DO YOU GET TOGETHER WITH FRIENDS OR RELATIVES?: ONCE A WEEK
DO YOU BELONG TO ANY CLUBS OR ORGANIZATIONS SUCH AS CHURCH GROUPS UNIONS, FRATERNAL OR ATHLETIC GROUPS, OR SCHOOL GROUPS?: NO
HOW OFTEN DO YOU ATTEND CHURCH OR RELIGIOUS SERVICES?: NEVER
IN A TYPICAL WEEK, HOW MANY TIMES DO YOU TALK ON THE PHONE WITH FAMILY, FRIENDS, OR NEIGHBORS?: ONCE A WEEK

## 2023-03-28 ASSESSMENT — LIFESTYLE VARIABLES
HOW MANY STANDARD DRINKS CONTAINING ALCOHOL DO YOU HAVE ON A TYPICAL DAY: 1 OR 2
SKIP TO QUESTIONS 9-10: 0
AUDIT-C TOTAL SCORE: 4
HOW OFTEN DO YOU HAVE A DRINK CONTAINING ALCOHOL: 2-3 TIMES A WEEK
HOW OFTEN DO YOU HAVE SIX OR MORE DRINKS ON ONE OCCASION: LESS THAN MONTHLY

## 2023-03-31 ENCOUNTER — LAB (OUTPATIENT)
Dept: PEDIATRICS | Facility: CLINIC | Age: 48
End: 2023-03-31

## 2023-03-31 ENCOUNTER — OFFICE VISIT (OUTPATIENT)
Dept: PEDIATRICS | Facility: CLINIC | Age: 48
End: 2023-03-31
Payer: COMMERCIAL

## 2023-03-31 VITALS
OXYGEN SATURATION: 100 % | WEIGHT: 173 LBS | TEMPERATURE: 97.1 F | HEART RATE: 68 BPM | SYSTOLIC BLOOD PRESSURE: 110 MMHG | RESPIRATION RATE: 16 BRPM | DIASTOLIC BLOOD PRESSURE: 70 MMHG | BODY MASS INDEX: 29.53 KG/M2 | HEIGHT: 64 IN

## 2023-03-31 DIAGNOSIS — Z12.83 SKIN CANCER SCREENING: ICD-10-CM

## 2023-03-31 DIAGNOSIS — Z12.11 SCREEN FOR COLON CANCER: ICD-10-CM

## 2023-03-31 DIAGNOSIS — Z12.31 VISIT FOR SCREENING MAMMOGRAM: ICD-10-CM

## 2023-03-31 DIAGNOSIS — Z00.00 ROUTINE GENERAL MEDICAL EXAMINATION AT A HEALTH CARE FACILITY: Primary | ICD-10-CM

## 2023-03-31 PROCEDURE — 99396 PREV VISIT EST AGE 40-64: CPT | Performed by: NURSE PRACTITIONER

## 2023-03-31 ASSESSMENT — ENCOUNTER SYMPTOMS
NAUSEA: 0
CHILLS: 0
CONSTIPATION: 0
DYSURIA: 0
SORE THROAT: 0
JOINT SWELLING: 0
HEMATURIA: 0
DIARRHEA: 0
NERVOUS/ANXIOUS: 0
MYALGIAS: 0
BREAST MASS: 0
EYE PAIN: 0
ABDOMINAL PAIN: 0
DIZZINESS: 0
SHORTNESS OF BREATH: 0
FEVER: 0
HEARTBURN: 0
COUGH: 0
WEAKNESS: 0
HEMATOCHEZIA: 0
PARESTHESIAS: 0
ARTHRALGIAS: 0
FREQUENCY: 1
PALPITATIONS: 0
HEADACHES: 0

## 2023-03-31 ASSESSMENT — PAIN SCALES - GENERAL: PAINLEVEL: NO PAIN (0)

## 2023-03-31 NOTE — PROGRESS NOTES
SUBJECTIVE:   CC: Minnie is an 48 year old who presents for preventive health visit.   Patient has been advised of split billing requirements and indicates understanding: Yes     Healthy Habits:     Getting at least 3 servings of Calcium per day:  NO    Bi-annual eye exam:  Yes    Dental care twice a year:  Yes    Sleep apnea or symptoms of sleep apnea:  None    Diet:  Gluten-free/reduced    Frequency of exercise:  None    Taking medications regularly:  Not Applicable    Medication side effects:  Not applicable    PHQ-2 Total Score: 0    Additional concerns today:  Yes    Mom and maternal grandfather had skin cancer, would like dermatology referral for routine skin check.     Had hysterectomy 8/2022 for menorrhagia. Stopped iron shortly after. Surgery went well.    Today's PHQ-2 Score:   PHQ-2 ( 1999 Pfizer) 3/28/2023   Q1: Little interest or pleasure in doing things 0   Q2: Feeling down, depressed or hopeless 0   PHQ-2 Score 0   Q1: Little interest or pleasure in doing things Not at all   Q2: Feeling down, depressed or hopeless Not at all   PHQ-2 Score 0       Social History     Tobacco Use     Smoking status: Never     Smokeless tobacco: Never   Substance Use Topics     Alcohol use: Yes     Comment: occsaional         Alcohol Use 3/28/2023   Prescreen: >3 drinks/day or >7 drinks/week? No     Reviewed orders with patient.  Reviewed health maintenance and updated orders accordingly - Yes  BP Readings from Last 3 Encounters:   03/31/23 110/70   08/25/22 122/82   07/22/22 110/72    Wt Readings from Last 3 Encounters:   03/31/23 78.5 kg (173 lb)   08/25/22 77.3 kg (170 lb 8 oz)   07/22/22 77.6 kg (171 lb)           Breast Cancer Screening:    Breast CA Risk Assessment (FHS-7) 2/10/2022 3/4/2022 7/20/2022   Do you have a family history of breast, colon, or ovarian cancer? Yes Yes No / Unknown       Mammogram Screening: Recommended annual mammography  Pertinent mammograms are reviewed under the imaging tab.    History of  abnormal Pap smear: Status post benign hysterectomy. Health Maintenance and Surgical History updated.  PAP / HPV Latest Ref Rng & Units 2022   PAP   Negative for Intraepithelial Lesion or Malignancy (NILM)   HPV16 Negative Negative   HPV18 Negative Negative   HRHPV Negative Negative     Reviewed and updated as needed this visit by clinical staff                  Reviewed and updated as needed this visit by Provider                 Patient Active Problem List   Diagnosis     Menorrhagia     Irritable bowel syndrome with diarrhea     Past Medical History:   Diagnosis Date     Anxiety      Past Surgical History:   Procedure Laterality Date     EYE SURGERY  Lasik    2009     GYN SURGERY  Lapro hysterectomy         LAPAROSCOPIC HYSTERECTOMY TOTAL, SALPINGECTOMY BILATERAL  2022    heavy uterine bleeding, still has ovaries     Family History   Problem Relation Age of Onset     Obesity Mother      Other Cancer Mother         Skin     Anxiety Disorder Mother      Deep Vein Thrombosis (DVT) Father      Diabetes Type 2  Father      Diabetes Father      Anesthesia Reaction Father      Hypertension Brother      Sleep Apnea Brother      Hypertension Maternal Grandfather      Other Cancer Maternal Grandfather         Skin     Cerebrovascular Disease Paternal Grandfather              Diabetes Paternal Grandmother              Depression Other         Son     Anxiety Disorder Other      Mental Illness Other         Anxiety/ADD     Social History     Socioeconomic History     Marital status:      Spouse name: Not on file     Number of children: Not on file     Years of education: Not on file     Highest education level: Not on file   Occupational History     Not on file   Tobacco Use     Smoking status: Never     Smokeless tobacco: Never   Vaping Use     Vaping Use: Never used   Substance and Sexual Activity     Alcohol use: Yes     Comment: occsaional     Drug use: Never     Sexual activity: Yes      Partners: Male     Birth control/protection: None     Comment:  has erectile disfunction   Other Topics Concern     Parent/sibling w/ CABG, MI or angioplasty before 65F 55M? No   Social History Narrative    Works at the  at PT clinic.     .     Two adult children in their 20's. Daughter with degree in criminal justice working for [employer] (as does her ). Son in 25, struggles with substance use, kicked out of her home a couple years ago.             Blanca Castro, DNP, APRN, CNP    02/11/22     Social Determinants of Health     Financial Resource Strain: Low Risk      Difficulty of Paying Living Expenses: Not hard at all   Food Insecurity: No Food Insecurity     Worried About Running Out of Food in the Last Year: Never true     Ran Out of Food in the Last Year: Never true   Transportation Needs: No Transportation Needs     Lack of Transportation (Medical): No     Lack of Transportation (Non-Medical): No   Physical Activity: Inactive     Days of Exercise per Week: 0 days     Minutes of Exercise per Session: 0 min   Stress: No Stress Concern Present     Feeling of Stress : Not at all   Social Connections: Socially Isolated     Frequency of Communication with Friends and Family: Once a week     Frequency of Social Gatherings with Friends and Family: Once a week     Attends Holiness Services: Never     Active Member of Clubs or Organizations: No     Attends Club or Organization Meetings: Not on file     Marital Status:    Intimate Partner Violence: Not on file   Housing Stability: Low Risk      Unable to Pay for Housing in the Last Year: No     Number of Places Lived in the Last Year: 1     Unstable Housing in the Last Year: No     Current Outpatient Medications   Medication     Multiple Vitamins-Iron (MULTIPLE VITAMIN/IRON OR)     OMEGA-3 FATTY ACIDS PO     No current facility-administered medications for this visit.        Allergies   Allergen Reactions     Codeine Sulfate       "Vomiting           Review of Systems   Constitutional: Negative for chills and fever.   HENT: Negative for congestion, ear pain, hearing loss and sore throat.    Eyes: Negative for pain and visual disturbance.   Respiratory: Negative for cough and shortness of breath.    Cardiovascular: Negative for chest pain, palpitations and peripheral edema.   Gastrointestinal: Negative for abdominal pain, constipation, diarrhea, heartburn, hematochezia and nausea.   Breasts:  Negative for tenderness, breast mass and discharge.   Genitourinary: Positive for frequency and urgency. Negative for dysuria, genital sores, hematuria, pelvic pain, vaginal bleeding and vaginal discharge.   Musculoskeletal: Negative for arthralgias, joint swelling and myalgias.   Skin: Negative for rash.   Neurological: Negative for dizziness, weakness, headaches and paresthesias.   Psychiatric/Behavioral: Negative for mood changes. The patient is not nervous/anxious.         OBJECTIVE:   /70 (BP Location: Right arm, Patient Position: Sitting, Cuff Size: Adult Regular)   Pulse 68   Temp 97.1  F (36.2  C) (Tympanic)   Resp 16   Ht 1.619 m (5' 3.74\")   Wt 78.5 kg (173 lb)   LMP 07/22/2022   SpO2 100%   BMI 29.94 kg/m    Physical Exam  GENERAL: healthy, alert and no distress  EYES: Eyes grossly normal to inspection, PERRL and conjunctivae and sclerae normal  HENT: ear canals and TM's normal, nose and mouth without ulcers or lesions  NECK: no adenopathy, no asymmetry, masses, or scars and thyroid normal to palpation  RESP: lungs clear to auscultation - no rales, rhonchi or wheezes  BREAST: Exam deferred (deferred after discussion of exam options with patient, no symptoms or concerns).   CV: regular rate and rhythm, normal S1 S2, no S3 or S4, no murmur, click or rub, no peripheral edema and peripheral pulses strong  : Exam deferred (deferred after discussion of exam options with patient, no symptoms or concerns, pap not indicated due to s/p " hysterectomy).   ABDOMEN: soft, nontender, no hepatosplenomegaly, no masses and bowel sounds normal  MS: no gross musculoskeletal defects noted, no edema  SKIN: no suspicious lesions or rashes  NEURO: Normal strength and tone, mentation intact and speech normal  PSYCH: mentation appears normal, affect normal/bright    Diagnostic Test Results:  Labs reviewed in Epic    ASSESSMENT/PLAN:   Routine general medical examination at a health care facility  Age appropriate screening and preventative care have been addressed today. Vaccinations have been reviewed and are up to date. Patient has been advised to undertake routine aerobic activity. Recommend annual vision exams as well as biannual dental exams. They will follow up for annual physical again in one year.   - Pap no longer indicated s/p hysterectomy (still has ovaries)  - Mammo - due  - Colon cancer screening - due    Screen for colon cancer  Reviewed screening options, she declines colonoscopy but is open to stool testing.   - COLOGUARD([employer]); Future    Visit for screening mammogram  - MA SCREENING DIGITAL BILAT - Future  (s+30); Future    Skin cancer screening  - Adult Dermatology Referral; Future          COUNSELING:  Reviewed preventive health counseling, as reflected in patient instructions  Special attention given to:        Regular exercise       Vision screening       Colorectal Cancer Screening        She reports that she has never smoked. She has never used smokeless tobacco.            MALINI RankinN, RN, FNP Student saw and evaluated the above patient along with myself. Note co-authored with student, I agree with the history, physical exam findings, and the assessment and plan.     [facility]ALIMA Islas [facility]

## 2023-04-14 ENCOUNTER — ANCILLARY PROCEDURE (OUTPATIENT)
Dept: MAMMOGRAPHY | Facility: CLINIC | Age: 48
End: 2023-04-14
Attending: NURSE PRACTITIONER
Payer: COMMERCIAL

## 2023-04-14 DIAGNOSIS — Z12.31 VISIT FOR SCREENING MAMMOGRAM: ICD-10-CM

## 2023-04-14 PROCEDURE — 77067 SCR MAMMO BI INCL CAD: CPT | Mod: TC | Performed by: RADIOLOGY

## 2023-07-07 ENCOUNTER — TRANSFERRED RECORDS (OUTPATIENT)
Dept: PEDIATRICS | Facility: CLINIC | Age: 48
End: 2023-07-07
Payer: COMMERCIAL

## 2024-04-21 SDOH — HEALTH STABILITY: PHYSICAL HEALTH: ON AVERAGE, HOW MANY MINUTES DO YOU ENGAGE IN EXERCISE AT THIS LEVEL?: 60 MIN

## 2024-04-21 SDOH — HEALTH STABILITY: PHYSICAL HEALTH: ON AVERAGE, HOW MANY DAYS PER WEEK DO YOU ENGAGE IN MODERATE TO STRENUOUS EXERCISE (LIKE A BRISK WALK)?: 3 DAYS

## 2024-04-21 ASSESSMENT — SOCIAL DETERMINANTS OF HEALTH (SDOH): HOW OFTEN DO YOU GET TOGETHER WITH FRIENDS OR RELATIVES?: THREE TIMES A WEEK

## 2024-04-26 ENCOUNTER — OFFICE VISIT (OUTPATIENT)
Dept: PEDIATRICS | Facility: CLINIC | Age: 49
End: 2024-04-26
Payer: COMMERCIAL

## 2024-04-26 VITALS
HEART RATE: 60 BPM | RESPIRATION RATE: 16 BRPM | DIASTOLIC BLOOD PRESSURE: 78 MMHG | WEIGHT: 174.9 LBS | SYSTOLIC BLOOD PRESSURE: 126 MMHG | BODY MASS INDEX: 29.86 KG/M2 | TEMPERATURE: 97.5 F | OXYGEN SATURATION: 100 % | HEIGHT: 64 IN

## 2024-04-26 DIAGNOSIS — Z00.00 ENCOUNTER FOR PREVENTATIVE ADULT HEALTH CARE EXAMINATION: Primary | ICD-10-CM

## 2024-04-26 DIAGNOSIS — E66.811 CLASS 1 OBESITY DUE TO EXCESS CALORIES WITHOUT SERIOUS COMORBIDITY WITH BODY MASS INDEX (BMI) OF 30.0 TO 30.9 IN ADULT: ICD-10-CM

## 2024-04-26 DIAGNOSIS — Z12.11 SCREEN FOR COLON CANCER: ICD-10-CM

## 2024-04-26 DIAGNOSIS — Z12.31 ENCOUNTER FOR SCREENING MAMMOGRAM FOR BREAST CANCER: ICD-10-CM

## 2024-04-26 DIAGNOSIS — E66.09 CLASS 1 OBESITY DUE TO EXCESS CALORIES WITHOUT SERIOUS COMORBIDITY WITH BODY MASS INDEX (BMI) OF 30.0 TO 30.9 IN ADULT: ICD-10-CM

## 2024-04-26 DIAGNOSIS — R03.0 ELEVATED BLOOD PRESSURE READING WITHOUT DIAGNOSIS OF HYPERTENSION: ICD-10-CM

## 2024-04-26 PROCEDURE — 99396 PREV VISIT EST AGE 40-64: CPT | Performed by: NURSE PRACTITIONER

## 2024-04-26 PROCEDURE — 80061 LIPID PANEL: CPT | Performed by: NURSE PRACTITIONER

## 2024-04-26 PROCEDURE — 36415 COLL VENOUS BLD VENIPUNCTURE: CPT | Performed by: NURSE PRACTITIONER

## 2024-04-26 PROCEDURE — 80053 COMPREHEN METABOLIC PANEL: CPT | Performed by: NURSE PRACTITIONER

## 2024-04-26 ASSESSMENT — PAIN SCALES - GENERAL: PAINLEVEL: NO PAIN (0)

## 2024-04-26 NOTE — PROGRESS NOTES
"Preventive Care Visit  [facility] LINETTE Castro, SALIMA CNP, Family Medicine  Apr 26, 2024      Assessment & Plan     Encounter for preventative adult health care examination  Age appropriate screening and preventative care have been addressed today. Vaccinations have been reviewed. Patient has been advised to follow a balanced diet with reduction in cholesterol, and reasonable portion sizes. They have been advised to undertake routine aerobic activity and they were counseled on healthy weight maintenance. Recommend annual vision exams as well as biannual dental exams. They will follow up for annual physical again in one year.   - pap no longer indicated, s/p hysterectomy  - mammo due  - colonoscopy due    Screen for colon cancer  - Colonoscopy Screening  Referral; Future    Encounter for screening mammogram for breast cancer  - *MA Screening Digital Bilateral; Future    Class 1 obesity due to excess calories without serious comorbidity with body mass index (BMI) of 30.0 to 30.9 in adult  Body mass index is 30.34 kg/m . Reviewed healthy lifestyle and strategies for weight loss. Has been making some healthy lifestyle changes, encouraged to continue.   - Lipid panel reflex to direct LDL Fasting  - Comprehensive metabolic panel (BMP + Alb, Alk Phos, ALT, AST, Total. Bili, TP)    Elevated blood pressure reading without diagnosis of hypertension  136/86, improved on recheck 126/78.  BP Readings from Last 3 Encounters:   04/26/24 126/78   03/31/23 110/70   08/25/22 122/82         BMI  Estimated body mass index is 30.34 kg/m  as calculated from the following:    Height as of this encounter: 1.617 m (5' 3.66\").    Weight as of this encounter: 79.3 kg (174 lb 14.4 oz).   Weight management plan: Discussed healthy diet and exercise guidelines    Counseling  Appropriate preventive services were discussed with this patient, including applicable screening as appropriate for fall prevention, nutrition, " physical activity, Tobacco-use cessation, weight loss and cognition.  Checklist reviewing preventive services available has been given to the patient.  Reviewed patient's diet, addressing concerns and/or questions.   She is at risk for lack of exercise and has been provided with information to increase physical activity for the benefit of her well-being.       Sharita Hartman is a 49 year old, presenting for the following:  Physical        4/26/2024     1:37 PM   Additional Questions   Roomed by Tanisha ROACH   Accompanied by N/A         4/26/2024     1:37 PM   Patient Reported Additional Medications   Patient reports taking the following new medications None          HPI      More headaches recently, last eye exam a couple years ago. Lasik in 2009. Needs cheaters for certain things again.     Weight has increased since her hysterectomy in 2022. Other contributors include the pandemic and loss of her dog. Has been making some little lifestyle changes but isn't seeing results. Cut out gluten and dairy (almost entirely) due to intolerance. Enjoys walking with her  but going at his pace she doesn't get a work-out in. This month she started class pass and is working out three days/week, group fitness. [employer] 2 days/week, meal prepping for work lunches.            4/21/2024   General Health   How would you rate your overall physical health? Good   Feel stress (tense, anxious, or unable to sleep) Not at all         4/21/2024   Nutrition   Three or more servings of calcium each day? (!) I DON'T KNOW   Diet: Gluten-free/reduced    Other   If other, please elaborate: dairy free/reduced   How many servings of fruit and vegetables per day? (!) 2-3   How many sweetened beverages each day? 0-1         4/21/2024   Exercise   Days per week of moderate/strenous exercise 3 days   Average minutes spent exercising at this level 60 min         4/21/2024   Social Factors   Frequency of gathering with friends or relatives Three  times a week   Worry food won't last until get money to buy more No   Food not last or not have enough money for food? No   Do you have housing?  Yes   Are you worried about losing your housing? No   Lack of transportation? No   Unable to get utilities (heat,electricity)? No         4/21/2024   Dental   Dentist two times every year? Yes         4/21/2024   TB Screening   Were you born outside of the [place]? No         Today's PHQ-2 Score:       4/26/2024     1:34 PM   PHQ-2 ( 1999 Pfizer)   Q1: Little interest or pleasure in doing things 0   Q2: Feeling down, depressed or hopeless 0   PHQ-2 Score 0   Q1: Little interest or pleasure in doing things Not at all   Q2: Feeling down, depressed or hopeless Not at all   PHQ-2 Score 0           4/21/2024   Substance Use   Alcohol more than 3/day or more than 7/wk No   Do you use any other substances recreationally? No     Social History     Tobacco Use    Smoking status: Never    Smokeless tobacco: Never   Vaping Use    Vaping status: Never Used   Substance Use Topics    Alcohol use: Yes     Comment: occsaional    Drug use: Never             4/21/2024   Breast Cancer Screening   Family history of breast, colon, or ovarian cancer? Yes         4/21/2024   LAST FHS-7 RESULTS   1st degree relative breast or ovarian cancer No   Any relative bilateral breast cancer Unknown   Any male have breast cancer No   Any ONE woman have BOTH breast AND ovarian cancer No   Any woman with breast cancer before 50yrs No   2 or more relatives with breast AND/OR ovarian cancer No   2 or more relatives with breast AND/OR bowel cancer No        Mammogram Screening - Mammogram every 1-2 years updated in Health Maintenance based on mutual decision making        4/21/2024   STI Screening   New sexual partner(s) since last STI/HIV test? No     History of abnormal Pap smear: Status post benign hysterectomy. Health Maintenance and Surgical History updated.        Latest Ref Rng & Units 2/11/2022    11:51 AM  "  PAP / HPV   PAP  Negative for Intraepithelial Lesion or Malignancy (NILM)    HPV 16 DNA Negative Negative    HPV 18 DNA Negative Negative    Other HR HPV Negative Negative      ASCVD Risk   The 10-year ASCVD risk score (Belen MEJIA, et al., 2019) is: 0.8%    Values used to calculate the score:      Age: 49 years      Sex: Female      Is Non- : No      Diabetic: No      Tobacco smoker: No      Systolic Blood Pressure: 136 mmHg      Is BP treated: No      HDL Cholesterol: 74 mg/dL      Total Cholesterol: 182 mg/dL       Reviewed and updated as needed this visit by Provider                    Patient Active Problem List   Diagnosis    Menorrhagia    Irritable bowel syndrome with diarrhea     Past Surgical History:   Procedure Laterality Date    EYE SURGERY  Lasik        GYN SURGERY  Lapro hysterectomy        LAPAROSCOPIC HYSTERECTOMY TOTAL, SALPINGECTOMY BILATERAL  2022    heavy uterine bleeding, still has ovaries       Social History     Tobacco Use    Smoking status: Never    Smokeless tobacco: Never   Substance Use Topics    Alcohol use: Yes     Comment: occsaional     Family History   Problem Relation Age of Onset    Obesity Mother     Other Cancer Mother         Skin    Anxiety Disorder Mother     Deep Vein Thrombosis (DVT) Father     Diabetes Type 2  Father     Diabetes Father     Anesthesia Reaction Father     Hypertension Brother     Sleep Apnea Brother     Hypertension Maternal Grandfather     Other Cancer Maternal Grandfather         Skin    Cerebrovascular Disease Paternal Grandfather             Diabetes Paternal Grandmother             Depression Other         Son    Anxiety Disorder Other     Mental Illness Other         Anxiety/ADD              Objective    Exam  /78 (BP Location: Right arm, Patient Position: Sitting, Cuff Size: Adult Regular)   Pulse 60   Temp 97.5  F (36.4  C) (Tympanic)   Resp 16   Ht 1.617 m (5' 3.66\")   Wt " "79.3 kg (174 lb 14.4 oz)   LMP 07/22/2022   SpO2 100%   BMI 30.34 kg/m     Estimated body mass index is 30.34 kg/m  as calculated from the following:    Height as of this encounter: 1.617 m (5' 3.66\").    Weight as of this encounter: 79.3 kg (174 lb 14.4 oz).    Physical Exam  Constitutional: appears to be in no acute distress, comfortable, pleasant.   Eyes: anicteric, conjunctiva clear without drainage or erythema. SANJANA.   Ears, Nose and Throat: tympanic membranes gray with LR,  nose without nasal discharge. OP: no erythema to posterior pharynx, negative post nasal drainage, tonsils +1 no erythema or exudate.  Neck: supple, thyroid palpable,not enlarged, no nodules   Breast: Exam deferred (deferred after discussion of exam options with patient, no symptoms or concerns).   Cardiovascular: regular rate and rhythm, normal S1 and S2, no murmurs, rubs or gallops, peripheral pulses full and symmetric; negative peripheral edema   Respiratory: Air entry throughout. Breathing pattern unlabored without the use of accessory muscles. Clear to auscultation A and P, no wheezes or crackles, normal breath sounds.    Gastrointestinal: rounded, soft. Positive bowel sounds x4, nontender, no masses.   Genitourinary: Exam deferred (deferred after discussion of exam options with patient, no symptoms or concerns, s/p hysterectomy).   Musculoskeletal: full range of motion, no edema.   Skin: pink, turgor smooth and elastic. Negative for lesions or dryness.  Neurological: normal gait, no tremor.   Psychological: appropriate mood and affect.   Lymphatic: no cervical, axillary, supraclavicular, or infraclavicular lymphadenopathy.        Signed Electronically by: SALIMA Islas CNP    "

## 2024-04-27 LAB
ALBUMIN SERPL BCG-MCNC: 4.8 G/DL (ref 3.5–5.2)
ALP SERPL-CCNC: 63 U/L (ref 40–150)
ALT SERPL W P-5'-P-CCNC: 18 U/L (ref 0–50)
ANION GAP SERPL CALCULATED.3IONS-SCNC: 9 MMOL/L (ref 7–15)
AST SERPL W P-5'-P-CCNC: 22 U/L (ref 0–45)
BILIRUB SERPL-MCNC: 0.5 MG/DL
BUN SERPL-MCNC: 10.6 MG/DL (ref 6–20)
CALCIUM SERPL-MCNC: 9.5 MG/DL (ref 8.6–10)
CHLORIDE SERPL-SCNC: 102 MMOL/L (ref 98–107)
CHOLEST SERPL-MCNC: 219 MG/DL
CREAT SERPL-MCNC: 0.85 MG/DL (ref 0.51–0.95)
DEPRECATED HCO3 PLAS-SCNC: 27 MMOL/L (ref 22–29)
EGFRCR SERPLBLD CKD-EPI 2021: 84 ML/MIN/1.73M2
FASTING STATUS PATIENT QL REPORTED: ABNORMAL
GLUCOSE SERPL-MCNC: 85 MG/DL (ref 70–99)
HDLC SERPL-MCNC: 80 MG/DL
LDLC SERPL CALC-MCNC: 124 MG/DL
NONHDLC SERPL-MCNC: 139 MG/DL
POTASSIUM SERPL-SCNC: 4.2 MMOL/L (ref 3.4–5.3)
PROT SERPL-MCNC: 7.8 G/DL (ref 6.4–8.3)
SODIUM SERPL-SCNC: 138 MMOL/L (ref 135–145)
TRIGL SERPL-MCNC: 74 MG/DL

## 2024-05-10 ENCOUNTER — ANCILLARY PROCEDURE (OUTPATIENT)
Dept: MAMMOGRAPHY | Facility: CLINIC | Age: 49
End: 2024-05-10
Attending: NURSE PRACTITIONER
Payer: COMMERCIAL

## 2024-05-10 DIAGNOSIS — Z12.31 ENCOUNTER FOR SCREENING MAMMOGRAM FOR BREAST CANCER: ICD-10-CM

## 2024-05-10 PROCEDURE — 77067 SCR MAMMO BI INCL CAD: CPT | Mod: TC | Performed by: RADIOLOGY

## 2024-08-12 ENCOUNTER — TRANSFERRED RECORDS (OUTPATIENT)
Dept: HEALTH INFORMATION MANAGEMENT | Facility: CLINIC | Age: 49
End: 2024-08-12
Payer: COMMERCIAL

## 2025-04-29 SDOH — HEALTH STABILITY: PHYSICAL HEALTH: ON AVERAGE, HOW MANY MINUTES DO YOU ENGAGE IN EXERCISE AT THIS LEVEL?: 40 MIN

## 2025-04-29 SDOH — HEALTH STABILITY: PHYSICAL HEALTH: ON AVERAGE, HOW MANY DAYS PER WEEK DO YOU ENGAGE IN MODERATE TO STRENUOUS EXERCISE (LIKE A BRISK WALK)?: 3 DAYS

## 2025-04-29 ASSESSMENT — SOCIAL DETERMINANTS OF HEALTH (SDOH): HOW OFTEN DO YOU GET TOGETHER WITH FRIENDS OR RELATIVES?: ONCE A WEEK

## 2025-04-30 ENCOUNTER — OFFICE VISIT (OUTPATIENT)
Dept: PEDIATRICS | Facility: CLINIC | Age: 50
End: 2025-04-30
Payer: COMMERCIAL

## 2025-04-30 VITALS
BODY MASS INDEX: 28.65 KG/M2 | RESPIRATION RATE: 18 BRPM | HEIGHT: 64 IN | DIASTOLIC BLOOD PRESSURE: 81 MMHG | OXYGEN SATURATION: 100 % | SYSTOLIC BLOOD PRESSURE: 116 MMHG | WEIGHT: 167.8 LBS | HEART RATE: 64 BPM | TEMPERATURE: 97.8 F

## 2025-04-30 DIAGNOSIS — M79.672 LEFT FOOT PAIN: ICD-10-CM

## 2025-04-30 DIAGNOSIS — Z00.00 ENCOUNTER FOR PREVENTATIVE ADULT HEALTH CARE EXAMINATION: Primary | ICD-10-CM

## 2025-04-30 DIAGNOSIS — Z12.31 ENCOUNTER FOR SCREENING MAMMOGRAM FOR BREAST CANCER: ICD-10-CM

## 2025-04-30 DIAGNOSIS — E66.3 OVERWEIGHT (BMI 25.0-29.9): ICD-10-CM

## 2025-04-30 DIAGNOSIS — Z23 ENCOUNTER FOR ADMINISTRATION OF VACCINE: ICD-10-CM

## 2025-04-30 PROCEDURE — 80061 LIPID PANEL: CPT | Performed by: NURSE PRACTITIONER

## 2025-04-30 PROCEDURE — 99396 PREV VISIT EST AGE 40-64: CPT | Mod: 25 | Performed by: NURSE PRACTITIONER

## 2025-04-30 PROCEDURE — 1126F AMNT PAIN NOTED NONE PRSNT: CPT | Performed by: NURSE PRACTITIONER

## 2025-04-30 PROCEDURE — 3074F SYST BP LT 130 MM HG: CPT | Performed by: NURSE PRACTITIONER

## 2025-04-30 PROCEDURE — 36415 COLL VENOUS BLD VENIPUNCTURE: CPT | Performed by: NURSE PRACTITIONER

## 2025-04-30 PROCEDURE — 90471 IMMUNIZATION ADMIN: CPT | Performed by: NURSE PRACTITIONER

## 2025-04-30 PROCEDURE — 80053 COMPREHEN METABOLIC PANEL: CPT | Performed by: NURSE PRACTITIONER

## 2025-04-30 PROCEDURE — 90746 HEPB VACCINE 3 DOSE ADULT IM: CPT | Performed by: NURSE PRACTITIONER

## 2025-04-30 PROCEDURE — 3079F DIAST BP 80-89 MM HG: CPT | Performed by: NURSE PRACTITIONER

## 2025-04-30 ASSESSMENT — PAIN SCALES - GENERAL: PAINLEVEL_OUTOF10: NO PAIN (0)

## 2025-04-30 NOTE — PROGRESS NOTES
"Preventive Care Visit  [facility] LINETTE Castro, SALIMA CNP, Family Medicine  Apr 30, 2025      Assessment & Plan     Encounter for preventative adult health care examination  Age appropriate screening and preventative care have been addressed today. Vaccinations have been updated. Patient has been advised to follow a balanced diet with reduction in cholesterol, and reasonable portion sizes. They have been advised to undertake routine aerobic activity and they were counseled on healthy weight maintenance. Colonoscopy has been reviewed and is up to date at this time. Recommend annual vision exams as well as biannual dental exams. They will follow up for annual physical again in one year.   Colonoscopy - utd, repeat in 5 years (2029) for polyps  Mammo - due  Pap - utd  Imm - will think about getting PCV20 and shingrix later this year    Encounter for screening mammogram for breast cancer  - MA Screen Bilateral w/Jose Miguel; Future    Overweight (BMI 25.0-29.9)  Has been working on healthy lifestyle over the past year and has had weight loss with this.  - Lipid panel reflex to direct LDL Fasting  - Comprehensive metabolic panel (BMP + Alb, Alk Phos, ALT, AST, Total. Bili, TP)    Left foot pain  Continue with conservative management for now. Supportive footwear, will try topical analgesia. Referral to podiatry prn.     Encounter for administration of vaccine  - HEPATITIS B, ADULT 20+ (ENGERIX-B/RECOMBIVAX HB)            BMI  Estimated body mass index is 28.8 kg/m  as calculated from the following:    Height as of this encounter: 1.626 m (5' 4\").    Weight as of this encounter: 76.1 kg (167 lb 12.8 oz).   Weight management plan: Discussed healthy diet and exercise guidelines    Counseling  Appropriate preventive services were addressed with this patient via screening, questionnaire, or discussion as appropriate for fall prevention, nutrition, physical activity, Tobacco-use cessation, social engagement, " weight loss and cognition.  Checklist reviewing preventive services available has been given to the patient.  Reviewed patient's diet, addressing concerns and/or questions.   She is at risk for lack of exercise and has been provided with information to increase physical activity for the benefit of her well-being.         Sharita Hartman is a 50 year old, presenting for the following:  Physical        4/30/2025     1:51 PM   Additional Questions   Roomed by Teir   Accompanied by self         4/30/2025     1:51 PM   Patient Reported Additional Medications   Patient reports taking the following new medications none         Healthy Habits:     Bi-annual eye exam:  NO    Taking medications regularly:  Not Applicable    Barriers to taking medications:  Not applicable    Medication side effects:  Not applicable    Additional concerns today:  Yes (left  foot,  right lower leg)    Colonoscopy - utd, repeat in 5 years (2029) for polyps  Mammo - due  Pap - utd    Has been working on healthy lifestyle over the past year and has had weight loss with this. Enjoying pilates and yoga.     Right outer foot pain. No injury. Started after working through some left knee pain.     Advance Care Planning    Discussed advance care planning with patient; however, patient declined at this time.        4/29/2025   General Health   How would you rate your overall physical health? Excellent   Feel stress (tense, anxious, or unable to sleep) Only a little   (!) STRESS CONCERN      4/29/2025   Nutrition   Three or more servings of calcium each day? (!) I DON'T KNOW   Diet: Gluten-free/reduced    Other   If other, please elaborate: Dairy free/reduced lactose intake   How many servings of fruit and vegetables per day? (!) 0-1   How many sweetened beverages each day? 0-1       Multiple values from one day are sorted in reverse-chronological order         4/29/2025   Exercise   Days per week of moderate/strenous exercise 3 days   Average minutes  spent exercising at this level 40 min         4/29/2025   Social Factors   Frequency of gathering with friends or relatives Once a week   Worry food won't last until get money to buy more No   Food not last or not have enough money for food? No   Do you have housing? (Housing is defined as stable permanent housing and does not include staying outside in a car, in a tent, in an abandoned building, in an overnight shelter, or couch-surfing.) Yes   Are you worried about losing your housing? No   Lack of transportation? No   Unable to get utilities (heat,electricity)? No         4/30/2025   Fall Risk   Gait Speed Test Interpretation Less than or equal to 5.00 seconds - PASS           4/29/2025   Dental   Dentist two times every year? Yes         Today's PHQ-2 Score:       4/29/2025    12:19 PM   PHQ-2 ( 1999 [employer])   Q1: Little interest or pleasure in doing things 1   Q2: Feeling down, depressed or hopeless 0   PHQ-2 Score 1    Q1: Little interest or pleasure in doing things Several days   Q2: Feeling down, depressed or hopeless Not at all   PHQ-2 Score 1       Patient-reported           4/29/2025   Substance Use   Alcohol more than 3/day or more than 7/wk No   Do you use any other substances recreationally? No     Social History     Tobacco Use    Smoking status: Never    Smokeless tobacco: Never   Vaping Use    Vaping status: Never Used   Substance Use Topics    Alcohol use: Yes     Comment: occsaional    Drug use: Never           5/10/2024   LAST FHS-7 RESULTS   2 or more relatives with breast AND/OR bowel cancer Yes        Mammogram Screening - Mammogram every 1-2 years updated in Health Maintenance based on mutual decision making        4/29/2025   STI Screening   New sexual partner(s) since last STI/HIV test? No     History of abnormal Pap smear: No - age 30-64 HPV with reflex Pap every 5 years recommended        Latest Ref Rng & Units 2/11/2022    11:51 AM   PAP / HPV   PAP  Negative for Intraepithelial Lesion  "or Malignancy (NILM)    HPV 16 DNA Negative Negative    HPV 18 DNA Negative Negative    Other HR HPV Negative Negative      ASCVD Risk   The 10-year ASCVD risk score (Belen MEJIA, et al., 2019) is: 0.7%    Values used to calculate the score:      Age: 50 years      Sex: Female      Is Non- : No      Diabetic: No      Tobacco smoker: No      Systolic Blood Pressure: 116 mmHg      Is BP treated: No      HDL Cholesterol: 80 mg/dL      Total Cholesterol: 219 mg/dL           Reviewed and updated as needed this visit by Provider                    Patient Active Problem List   Diagnosis    Menorrhagia    Irritable bowel syndrome with diarrhea     Past Surgical History:   Procedure Laterality Date    EYE SURGERY  Lasik        GYN SURGERY  Lapro hysterectomy        LAPAROSCOPIC HYSTERECTOMY TOTAL, SALPINGECTOMY BILATERAL  2022    heavy uterine bleeding, still has ovaries       Social History     Tobacco Use    Smoking status: Never    Smokeless tobacco: Never   Substance Use Topics    Alcohol use: Yes     Comment: occsaional     Family History   Problem Relation Age of Onset    Obesity Mother     Other Cancer Mother         Skin    Anxiety Disorder Mother     Deep Vein Thrombosis (DVT) Father     Diabetes Type 2  Father     Diabetes Father     Anesthesia Reaction Father     Hypertension Brother     Sleep Apnea Brother     Hypertension Maternal Grandfather     Other Cancer Maternal Grandfather         Skin    Cerebrovascular Disease Paternal Grandfather             Diabetes Paternal Grandmother             Depression Other         Son    Anxiety Disorder Other     Mental Illness Other         Anxiety/ADD              Objective    Exam  /81   Pulse 64   Temp 97.8  F (36.6  C) (Oral)   Resp 18   Ht 1.626 m (5' 4\")   Wt 76.1 kg (167 lb 12.8 oz)   LMP 2022   SpO2 100%   BMI 28.80 kg/m     Estimated body mass index is 28.8 kg/m  as calculated from " "the following:    Height as of this encounter: 1.626 m (5' 4\").    Weight as of this encounter: 76.1 kg (167 lb 12.8 oz).      Physical Exam  Constitutional: appears to be in no acute distress, comfortable, pleasant.   Eyes: anicteric, conjunctiva clear without drainage or erythema. SANJANA.   Ears, Nose and Throat: tympanic membranes gray with LR,  nose without nasal discharge. OP: no erythema to posterior pharynx, negative post nasal drainage, tonsils +1 no erythema or exudate.  Neck: supple, thyroid palpable,not enlarged, no nodules   Breast: Exam deferred (deferred after discussion of exam options with patient, no symptoms or concerns).   Cardiovascular: regular rate and rhythm, normal S1 and S2, no murmurs, rubs or gallops, peripheral pulses full and symmetric; negative peripheral edema   Respiratory: Air entry throughout. Breathing pattern unlabored without the use of accessory muscles. Clear to auscultation A and P, no wheezes or crackles, normal breath sounds.    Gastrointestinal: rounded, soft. Positive bowel sounds x4, nontender, no masses.   Genitourinary: Exam deferred (deferred after discussion of exam options with patient, no symptoms or concerns, pap is up to date).   Musculoskeletal: full range of motion, no edema.   Skin: pink, turgor smooth and elastic. Negative for lesions or dryness.  Neurological: normal gait, no tremor.   Psychological: appropriate mood and affect.   Lymphatic: no cervical, axillary, supraclavicular, or infraclavicular lymphadenopathy.          Signed Electronically by: SALIMA Islas CNP    "

## 2025-05-01 LAB
ALBUMIN SERPL BCG-MCNC: 4.6 G/DL (ref 3.5–5.2)
ALP SERPL-CCNC: 81 U/L (ref 40–150)
ALT SERPL W P-5'-P-CCNC: 16 U/L (ref 0–50)
ANION GAP SERPL CALCULATED.3IONS-SCNC: 11 MMOL/L (ref 7–15)
AST SERPL W P-5'-P-CCNC: 22 U/L (ref 0–45)
BILIRUB SERPL-MCNC: 0.5 MG/DL
BUN SERPL-MCNC: 13.2 MG/DL (ref 6–20)
CALCIUM SERPL-MCNC: 9.3 MG/DL (ref 8.8–10.4)
CHLORIDE SERPL-SCNC: 103 MMOL/L (ref 98–107)
CHOLEST SERPL-MCNC: 192 MG/DL
CREAT SERPL-MCNC: 0.85 MG/DL (ref 0.51–0.95)
EGFRCR SERPLBLD CKD-EPI 2021: 83 ML/MIN/1.73M2
FASTING STATUS PATIENT QL REPORTED: YES
FASTING STATUS PATIENT QL REPORTED: YES
GLUCOSE SERPL-MCNC: 85 MG/DL (ref 70–99)
HCO3 SERPL-SCNC: 26 MMOL/L (ref 22–29)
HDLC SERPL-MCNC: 75 MG/DL
LDLC SERPL CALC-MCNC: 106 MG/DL
NONHDLC SERPL-MCNC: 117 MG/DL
POTASSIUM SERPL-SCNC: 3.8 MMOL/L (ref 3.4–5.3)
PROT SERPL-MCNC: 7.1 G/DL (ref 6.4–8.3)
SODIUM SERPL-SCNC: 140 MMOL/L (ref 135–145)
TRIGL SERPL-MCNC: 54 MG/DL

## 2025-05-14 ENCOUNTER — ANCILLARY PROCEDURE (OUTPATIENT)
Dept: MAMMOGRAPHY | Facility: CLINIC | Age: 50
End: 2025-05-14
Attending: NURSE PRACTITIONER
Payer: COMMERCIAL

## 2025-05-14 DIAGNOSIS — Z12.31 ENCOUNTER FOR SCREENING MAMMOGRAM FOR BREAST CANCER: ICD-10-CM

## 2025-05-14 PROCEDURE — 77067 SCR MAMMO BI INCL CAD: CPT | Mod: TC | Performed by: RADIOLOGY

## 2025-05-14 PROCEDURE — 77063 BREAST TOMOSYNTHESIS BI: CPT | Mod: TC | Performed by: RADIOLOGY
